# Patient Record
Sex: MALE | Race: WHITE | NOT HISPANIC OR LATINO | Employment: OTHER | URBAN - METROPOLITAN AREA
[De-identification: names, ages, dates, MRNs, and addresses within clinical notes are randomized per-mention and may not be internally consistent; named-entity substitution may affect disease eponyms.]

---

## 2023-01-29 PROBLEM — M25.641 STIFFNESS OF FINGER JOINT OF RIGHT HAND: Status: ACTIVE | Noted: 2023-01-29

## 2023-01-29 PROBLEM — M54.50 CHRONIC LOW BACK PAIN: Status: ACTIVE | Noted: 2023-01-29

## 2023-01-29 PROBLEM — L57.0 SENILE KERATOSIS: Status: ACTIVE | Noted: 2023-01-29

## 2023-01-29 PROBLEM — J20.9 ACUTE BRONCHITIS: Status: ACTIVE | Noted: 2023-01-29

## 2023-01-29 PROBLEM — I10 HYPERTENSION: Status: ACTIVE | Noted: 2023-01-29

## 2023-01-29 PROBLEM — M54.2 NECK PAIN ON RIGHT SIDE: Status: ACTIVE | Noted: 2023-01-29

## 2023-01-29 PROBLEM — M54.16 LUMBAR RADICULOPATHY, ACUTE: Status: ACTIVE | Noted: 2023-01-29

## 2023-01-29 PROBLEM — M79.673 CHRONIC FOOT PAIN: Status: ACTIVE | Noted: 2023-01-29

## 2023-01-29 PROBLEM — I73.9 PVD (PERIPHERAL VASCULAR DISEASE) (CMS-HCC): Status: ACTIVE | Noted: 2023-01-29

## 2023-01-29 PROBLEM — M72.0 DUPUYTREN'S CONTRACTURE OF RIGHT HAND: Status: ACTIVE | Noted: 2023-01-29

## 2023-01-29 PROBLEM — R19.7 DIARRHEA: Status: ACTIVE | Noted: 2023-01-29

## 2023-01-29 PROBLEM — R79.89 INCREASED VITAMIN B12 LEVEL: Status: ACTIVE | Noted: 2023-01-29

## 2023-01-29 PROBLEM — H61.20 CERUMEN IMPACTION: Status: ACTIVE | Noted: 2023-01-29

## 2023-01-29 PROBLEM — J01.90 ACUTE SINUSITIS: Status: ACTIVE | Noted: 2023-01-29

## 2023-01-29 PROBLEM — R05.9 COUGH: Status: ACTIVE | Noted: 2023-01-29

## 2023-01-29 PROBLEM — L30.9 DERMATITIS: Status: ACTIVE | Noted: 2023-01-29

## 2023-01-29 PROBLEM — L50.9 HIVES: Status: ACTIVE | Noted: 2023-01-29

## 2023-01-29 PROBLEM — N40.0 BENIGN ENLARGEMENT OF PROSTATE: Status: ACTIVE | Noted: 2023-01-29

## 2023-01-29 PROBLEM — E78.5 HYPERLIPIDEMIA: Status: ACTIVE | Noted: 2023-01-29

## 2023-01-29 PROBLEM — L85.3 DRY SKIN DERMATITIS: Status: ACTIVE | Noted: 2023-01-29

## 2023-01-29 PROBLEM — R74.8 INCREASED VITAMIN B12 LEVEL: Status: ACTIVE | Noted: 2023-01-29

## 2023-01-29 PROBLEM — R20.2 PARESTHESIA OF RIGHT LEG: Status: ACTIVE | Noted: 2023-01-29

## 2023-01-29 PROBLEM — R29.898 WEAKNESS OF RIGHT LOWER EXTREMITY: Status: ACTIVE | Noted: 2023-01-29

## 2023-01-29 PROBLEM — L82.1 SEBORRHEIC KERATOSIS: Status: ACTIVE | Noted: 2023-01-29

## 2023-01-29 PROBLEM — M25.562 KNEE PAIN, LEFT: Status: ACTIVE | Noted: 2023-01-29

## 2023-01-29 PROBLEM — M46.1 SACROILIITIS (CMS-HCC): Status: ACTIVE | Noted: 2023-01-29

## 2023-01-29 PROBLEM — E53.8 B12 DEFICIENCY: Status: ACTIVE | Noted: 2023-01-29

## 2023-01-29 PROBLEM — D22.4 MELANOCYTIC NEVUS OF SCALP: Status: ACTIVE | Noted: 2023-01-29

## 2023-01-29 PROBLEM — F41.1 GENERALIZED ANXIETY DISORDER: Status: ACTIVE | Noted: 2023-01-29

## 2023-01-29 PROBLEM — H10.33 ACUTE CONJUNCTIVITIS OF BOTH EYES: Status: ACTIVE | Noted: 2023-01-29

## 2023-01-29 PROBLEM — M15.9 GENERALIZED OSTEOARTHRITIS OF MULTIPLE SITES: Status: ACTIVE | Noted: 2023-01-29

## 2023-01-29 PROBLEM — E55.9 VITAMIN D DEFICIENCY: Status: ACTIVE | Noted: 2023-01-29

## 2023-01-29 PROBLEM — I73.00 RAYNAUDS DISEASE: Status: ACTIVE | Noted: 2023-01-29

## 2023-01-29 PROBLEM — M79.671 RIGHT FOOT PAIN: Status: ACTIVE | Noted: 2023-01-29

## 2023-01-29 PROBLEM — G89.29 CHRONIC FOOT PAIN: Status: ACTIVE | Noted: 2023-01-29

## 2023-01-29 PROBLEM — G89.29 CHRONIC LOW BACK PAIN: Status: ACTIVE | Noted: 2023-01-29

## 2023-01-29 RX ORDER — METHYLPREDNISOLONE 4 MG/1
4 TABLET ORAL
COMMUNITY
Start: 2021-10-02 | End: 2023-10-16 | Stop reason: WASHOUT

## 2023-01-29 RX ORDER — SODIUM FLUORIDE 5 MG/G
GEL, DENTIFRICE DENTAL
COMMUNITY
Start: 2020-11-18

## 2023-01-29 RX ORDER — BENZONATATE 200 MG/1
200 CAPSULE ORAL 3 TIMES DAILY PRN
COMMUNITY
Start: 2021-12-28 | End: 2023-10-16 | Stop reason: WASHOUT

## 2023-01-29 RX ORDER — ZINC SULFATE 50(220)MG
CAPSULE ORAL
COMMUNITY

## 2023-01-29 RX ORDER — AMLODIPINE BESYLATE 2.5 MG/1
1 TABLET ORAL DAILY
COMMUNITY
Start: 2016-05-17 | End: 2024-04-03

## 2023-01-29 RX ORDER — VIT C/E/ZN/COPPR/LUTEIN/ZEAXAN 250MG-90MG
25 CAPSULE ORAL
COMMUNITY

## 2023-03-13 ENCOUNTER — APPOINTMENT (OUTPATIENT)
Dept: PRIMARY CARE | Facility: CLINIC | Age: 73
End: 2023-03-13
Payer: MEDICARE

## 2023-03-21 ENCOUNTER — HOSPITAL ENCOUNTER (OUTPATIENT)
Dept: DATA CONVERSION | Facility: HOSPITAL | Age: 73
End: 2023-03-25
Attending: PHYSICAL MEDICINE & REHABILITATION
Payer: MEDICARE

## 2023-03-22 LAB
ANION GAP IN SER/PLAS: 11 MMOL/L (ref 10–20)
CALCIUM (MG/DL) IN SER/PLAS: 8.7 MG/DL (ref 8.6–10.3)
CARBON DIOXIDE, TOTAL (MMOL/L) IN SER/PLAS: 30 MMOL/L (ref 21–32)
CHLORIDE (MMOL/L) IN SER/PLAS: 97 MMOL/L (ref 98–107)
CREATININE (MG/DL) IN SER/PLAS: 0.83 MG/DL (ref 0.5–1.3)
ERYTHROCYTE DISTRIBUTION WIDTH (RATIO) BY AUTOMATED COUNT: 12.4 % (ref 11.5–14.5)
ERYTHROCYTE DISTRIBUTION WIDTH (RATIO) BY AUTOMATED COUNT: 12.4 % (ref 11.5–14.5)
ERYTHROCYTE MEAN CORPUSCULAR HEMOGLOBIN CONCENTRATION (G/DL) BY AUTOMATED: 34.4 G/DL (ref 32–36)
ERYTHROCYTE MEAN CORPUSCULAR HEMOGLOBIN CONCENTRATION (G/DL) BY AUTOMATED: 34.4 G/DL (ref 32–36)
ERYTHROCYTE MEAN CORPUSCULAR VOLUME (FL) BY AUTOMATED COUNT: 92 FL (ref 80–100)
ERYTHROCYTE MEAN CORPUSCULAR VOLUME (FL) BY AUTOMATED COUNT: 92 FL (ref 80–100)
ERYTHROCYTES (10*6/UL) IN BLOOD BY AUTOMATED COUNT: 3.32 X10E12/L (ref 4.5–5.9)
ERYTHROCYTES (10*6/UL) IN BLOOD BY AUTOMATED COUNT: 3.32 X10E12/L (ref 4.5–5.9)
GFR MALE: >90 ML/MIN/1.73M2
GLUCOSE (MG/DL) IN SER/PLAS: 90 MG/DL (ref 74–99)
HEMATOCRIT (%) IN BLOOD BY AUTOMATED COUNT: 30.5 % (ref 41–52)
HEMATOCRIT (%) IN BLOOD BY AUTOMATED COUNT: 30.5 % (ref 41–52)
HEMOGLOBIN (G/DL) IN BLOOD: 10.5 G/DL (ref 13.5–17.5)
HEMOGLOBIN (G/DL) IN BLOOD: 10.5 G/DL (ref 13.5–17.5)
LEUKOCYTES (10*3/UL) IN BLOOD BY AUTOMATED COUNT: 9 X10E9/L (ref 4.4–11.3)
LEUKOCYTES (10*3/UL) IN BLOOD BY AUTOMATED COUNT: 9 X10E9/L (ref 4.4–11.3)
PLATELETS (10*3/UL) IN BLOOD AUTOMATED COUNT: 393 X10E9/L (ref 150–450)
PLATELETS (10*3/UL) IN BLOOD AUTOMATED COUNT: 393 X10E9/L (ref 150–450)
POTASSIUM (MMOL/L) IN SER/PLAS: 3.7 MMOL/L (ref 3.5–5.3)
SODIUM (MMOL/L) IN SER/PLAS: 134 MMOL/L (ref 136–145)
UREA NITROGEN (MG/DL) IN SER/PLAS: 19 MG/DL (ref 6–23)

## 2023-03-30 ENCOUNTER — APPOINTMENT (OUTPATIENT)
Dept: PRIMARY CARE | Facility: CLINIC | Age: 73
End: 2023-03-30
Payer: MEDICARE

## 2023-04-03 ENCOUNTER — APPOINTMENT (OUTPATIENT)
Dept: PRIMARY CARE | Facility: CLINIC | Age: 73
End: 2023-04-03
Payer: MEDICARE

## 2023-04-05 ENCOUNTER — OFFICE VISIT (OUTPATIENT)
Dept: PRIMARY CARE | Facility: CLINIC | Age: 73
End: 2023-04-05
Payer: MEDICARE

## 2023-04-05 VITALS
HEART RATE: 100 BPM | WEIGHT: 134 LBS | SYSTOLIC BLOOD PRESSURE: 109 MMHG | HEIGHT: 68 IN | TEMPERATURE: 96.6 F | OXYGEN SATURATION: 95 % | DIASTOLIC BLOOD PRESSURE: 74 MMHG | RESPIRATION RATE: 16 BRPM | BODY MASS INDEX: 20.31 KG/M2

## 2023-04-05 DIAGNOSIS — Z00.00 HEALTHCARE MAINTENANCE: ICD-10-CM

## 2023-04-05 DIAGNOSIS — M54.40 CHRONIC LOW BACK PAIN WITH SCIATICA, SCIATICA LATERALITY UNSPECIFIED, UNSPECIFIED BACK PAIN LATERALITY: ICD-10-CM

## 2023-04-05 DIAGNOSIS — G89.29 CHRONIC LOW BACK PAIN WITH SCIATICA, SCIATICA LATERALITY UNSPECIFIED, UNSPECIFIED BACK PAIN LATERALITY: ICD-10-CM

## 2023-04-05 DIAGNOSIS — I73.9 PVD (PERIPHERAL VASCULAR DISEASE) (CMS-HCC): ICD-10-CM

## 2023-04-05 DIAGNOSIS — D64.9 NORMOCYTIC ANEMIA: ICD-10-CM

## 2023-04-05 DIAGNOSIS — Z00.00 ROUTINE HEALTH MAINTENANCE: ICD-10-CM

## 2023-04-05 DIAGNOSIS — J18.9 COMMUNITY ACQUIRED PNEUMONIA OF LEFT UPPER LOBE OF LUNG: Primary | ICD-10-CM

## 2023-04-05 DIAGNOSIS — E78.5 HYPERLIPIDEMIA, UNSPECIFIED HYPERLIPIDEMIA TYPE: ICD-10-CM

## 2023-04-05 DIAGNOSIS — R74.8 ELEVATED LIVER ENZYMES: ICD-10-CM

## 2023-04-05 DIAGNOSIS — F10.90 ALCOHOL USE DISORDER: ICD-10-CM

## 2023-04-05 DIAGNOSIS — I10 PRIMARY HYPERTENSION: ICD-10-CM

## 2023-04-05 PROCEDURE — 1160F RVW MEDS BY RX/DR IN RCRD: CPT | Performed by: STUDENT IN AN ORGANIZED HEALTH CARE EDUCATION/TRAINING PROGRAM

## 2023-04-05 PROCEDURE — 1036F TOBACCO NON-USER: CPT | Performed by: STUDENT IN AN ORGANIZED HEALTH CARE EDUCATION/TRAINING PROGRAM

## 2023-04-05 PROCEDURE — 3078F DIAST BP <80 MM HG: CPT | Performed by: STUDENT IN AN ORGANIZED HEALTH CARE EDUCATION/TRAINING PROGRAM

## 2023-04-05 PROCEDURE — 1159F MED LIST DOCD IN RCRD: CPT | Performed by: STUDENT IN AN ORGANIZED HEALTH CARE EDUCATION/TRAINING PROGRAM

## 2023-04-05 PROCEDURE — 3074F SYST BP LT 130 MM HG: CPT | Performed by: STUDENT IN AN ORGANIZED HEALTH CARE EDUCATION/TRAINING PROGRAM

## 2023-04-05 PROCEDURE — 99214 OFFICE O/P EST MOD 30 MIN: CPT | Performed by: STUDENT IN AN ORGANIZED HEALTH CARE EDUCATION/TRAINING PROGRAM

## 2023-04-05 PROCEDURE — 3008F BODY MASS INDEX DOCD: CPT | Performed by: STUDENT IN AN ORGANIZED HEALTH CARE EDUCATION/TRAINING PROGRAM

## 2023-04-05 RX ORDER — CEFDINIR 300 MG/1
300 CAPSULE ORAL 2 TIMES DAILY
Qty: 20 CAPSULE | Refills: 0 | Status: SHIPPED | OUTPATIENT
Start: 2023-04-05 | End: 2023-04-15

## 2023-04-05 RX ORDER — AZITHROMYCIN 1 G/1
1 POWDER, FOR SUSPENSION ORAL ONCE
COMMUNITY
End: 2023-10-16 | Stop reason: WASHOUT

## 2023-04-05 RX ORDER — TRAMADOL HYDROCHLORIDE 50 MG/1
50 TABLET ORAL EVERY 8 HOURS PRN
COMMUNITY
End: 2023-10-16 | Stop reason: WASHOUT

## 2023-04-05 RX ORDER — LOSARTAN POTASSIUM 50 MG/1
50 TABLET ORAL DAILY
COMMUNITY
End: 2024-04-03

## 2023-04-05 ASSESSMENT — ENCOUNTER SYMPTOMS
WHEEZING: 0
SHORTNESS OF BREATH: 0
HEADACHES: 1
LIGHT-HEADEDNESS: 0
NAUSEA: 0
SINUS PAIN: 0
DIZZINESS: 0
VOMITING: 0
FEVER: 0
COUGH: 1
CHILLS: 0
DIAPHORESIS: 0

## 2023-04-05 ASSESSMENT — PAIN SCALES - GENERAL: PAINLEVEL: 0-NO PAIN

## 2023-04-05 ASSESSMENT — PATIENT HEALTH QUESTIONNAIRE - PHQ9
1. LITTLE INTEREST OR PLEASURE IN DOING THINGS: NOT AT ALL
2. FEELING DOWN, DEPRESSED OR HOPELESS: NOT AT ALL
SUM OF ALL RESPONSES TO PHQ9 QUESTIONS 1 AND 2: 0

## 2023-04-05 NOTE — PROGRESS NOTES
"Subjective   Patient ID: Hal Gregorio is a 72 y.o. male who presents for Establish Care.  He is here to establish care.     He was seen in urgent care on Monday for URI sy mptoms. He was started on zpack and was started on cough suppressant.    He reports chronic pain in his low back and sciatica.    He was recently hospitalized. Hospital course reviewed. He stopped drinking since his hospitalization.  He is not involved in alcoholic Anonymous but does not find it would be of much help for him.    Current Specialists:  Pain Management - no longer following  Cardiology - Dr. Lainez  Neurosurgery - Dr. Aldair Cruz  Ophthalmology - Sees retina specialist    Social History:  Served in the [employer]. He is  and has 2 daughters and 1 son. He has 4 grand kids.         Review of Systems   Constitutional:  Negative for chills, diaphoresis and fever.   HENT:  Negative for ear pain and sinus pain.    Respiratory:  Positive for cough (Productive of yellowish sputum). Negative for shortness of breath and wheezing.    Cardiovascular:  Negative for chest pain.   Gastrointestinal:  Negative for nausea and vomiting.   Neurological:  Positive for headaches. Negative for dizziness and light-headedness.       /74 (BP Location: Left arm, Patient Position: Sitting, BP Cuff Size: Adult)   Pulse 100   Temp 35.9 °C (96.6 °F) (Temporal)   Resp 16   Ht 1.727 m (5' 8\")   Wt 60.8 kg (134 lb)   SpO2 95%   BMI 20.37 kg/m²     Objective   Physical Exam  Vitals reviewed.   Constitutional:       General: He is not in acute distress.     Appearance: Normal appearance.   HENT:      Head: Normocephalic.      Right Ear: Tympanic membrane, ear canal and external ear normal. There is no impacted cerumen.      Left Ear: Tympanic membrane, ear canal and external ear normal. There is no impacted cerumen.      Mouth/Throat:      Mouth: Mucous membranes are moist.      Pharynx: Oropharynx is clear. No oropharyngeal exudate or posterior " oropharyngeal erythema.   Cardiovascular:      Rate and Rhythm: Normal rate and regular rhythm.   Pulmonary:      Effort: Pulmonary effort is normal. No respiratory distress.      Breath sounds: Rales (SURYA rales present) present. No wheezing or rhonchi.   Abdominal:      General: There is no distension.   Musculoskeletal:         General: No deformity.      Cervical back: Neck supple. No tenderness.   Lymphadenopathy:      Cervical: No cervical adenopathy.   Skin:     Coloration: Skin is not jaundiced.   Neurological:      Mental Status: He is alert.   Psychiatric:         Mood and Affect: Mood normal.         Behavior: Behavior normal.         Assessment/Plan   Problem List Items Addressed This Visit          Respiratory    Community acquired pneumonia of left upper lobe of lung - Primary     Symptoms and physical exam consistent with possible developing pneumonia of left upper lobe.  Will empirically treat with Omnicef twice a day x10 days.  Has tolerated Keflex in the past.  Follow-up if no improvement may need to get x-ray imaging of the chest.         Relevant Medications    cefdinir (Omnicef) 300 mg capsule       Circulatory    PVD (peripheral vascular disease) (CMS/HCC)     Continue follow-up with cardiology.         Hypertension     Continue losartan 50 mg a day and amlodipine 2.5 mg a day.  Blood pressure controlled today            Hematologic    Normocytic anemia     Repeat CBC in 2-4 weeks.         Relevant Orders    CBC       Other    Chronic low back pain     Advised that I do not treat chronic low back pain and if you would like this to be assessed in the future he should follow-up with pain management and that I could refer if needed.  He said that he is also interested in medical marijuana and I advised that I would not be able to support this plan.         Hyperlipidemia     Followed with cardiology for this, note reviewed.         [facility] course reviewed.  Follow-up  with me in 3 months for complete physical exam or sooner if needed.         Relevant Orders    Follow Up In [facility] - PCP    Alcohol use disorder     Hospitalization course reviewed.  Patient states he has not drank at least since his hospitalization and does not remember the last time he had a drink.  He does not follow with alcoholic Anonymous and recommended to follow-up with a support group for this.  Congratulated his efforts on quitting.         Elevated liver enzymes     Repeat CMP in 2-4 weeks. Noted on prior CMP during hospitalization.         Relevant Orders    Comprehensive Metabolic Panel     Other Visit Diagnoses       Routine health maintenance

## 2023-04-06 ENCOUNTER — TELEPHONE (OUTPATIENT)
Dept: PRIMARY CARE | Facility: CLINIC | Age: 73
End: 2023-04-06
Payer: MEDICARE

## 2023-04-06 NOTE — TELEPHONE ENCOUNTER
Pt called stating whole family was diagnosed with parainfluenza, and cough can last as long as 3wks.  He stated grandkids and kids all have it, they were recently all together.

## 2023-04-06 NOTE — ASSESSMENT & PLAN NOTE
Hospital course reviewed.  Follow-up with me in 3 months for complete physical exam or sooner if needed.

## 2023-04-06 NOTE — ASSESSMENT & PLAN NOTE
Advised that I do not treat chronic low back pain and if you would like this to be assessed in the future he should follow-up with pain management and that I could refer if needed.  He said that he is also interested in medical marijuana and I advised that I would not be able to support this plan.

## 2023-04-06 NOTE — TELEPHONE ENCOUNTER
----- Message from Derrick He DO sent at 4/5/2023 10:16 PM EDT -----  Please let him know I'd like to repeat blood work in 2-4 weeks to monitor anemia and liver enzymes.

## 2023-04-06 NOTE — ASSESSMENT & PLAN NOTE
Symptoms and physical exam consistent with possible developing pneumonia of left upper lobe.  Will empirically treat with Omnicef twice a day x10 days.  Has tolerated Keflex in the past.  Follow-up if no improvement may need to get x-ray imaging of the chest.

## 2023-04-06 NOTE — ASSESSMENT & PLAN NOTE
Hospitalization course reviewed.  Patient states he has not drank at least since his hospitalization and does not remember the last time he had a drink.  He does not follow with alcoholic Anonymous and recommended to follow-up with a support group for this.  Congratulated his efforts on quitting.

## 2023-07-07 PROBLEM — S63.501A SPRAIN OF RIGHT WRIST: Status: ACTIVE | Noted: 2023-07-07

## 2023-07-07 PROBLEM — M04.1: Status: ACTIVE | Noted: 2023-07-07

## 2023-07-07 PROBLEM — M25.531 ACUTE PAIN OF RIGHT WRIST: Status: ACTIVE | Noted: 2023-07-07

## 2023-07-07 PROBLEM — F10.939 ALCOHOL WITHDRAWAL (MULTI): Status: ACTIVE | Noted: 2023-07-07

## 2023-07-10 ENCOUNTER — LAB (OUTPATIENT)
Dept: LAB | Facility: LAB | Age: 73
End: 2023-07-10
Payer: MEDICARE

## 2023-07-10 ENCOUNTER — OFFICE VISIT (OUTPATIENT)
Dept: PRIMARY CARE | Facility: CLINIC | Age: 73
End: 2023-07-10
Payer: MEDICARE

## 2023-07-10 VITALS
SYSTOLIC BLOOD PRESSURE: 112 MMHG | OXYGEN SATURATION: 97 % | HEART RATE: 83 BPM | TEMPERATURE: 98 F | WEIGHT: 135 LBS | HEIGHT: 66 IN | BODY MASS INDEX: 21.69 KG/M2 | DIASTOLIC BLOOD PRESSURE: 72 MMHG

## 2023-07-10 DIAGNOSIS — Z13.89 SCREENING FOR MULTIPLE CONDITIONS: ICD-10-CM

## 2023-07-10 DIAGNOSIS — G89.29 CHRONIC NECK PAIN: ICD-10-CM

## 2023-07-10 DIAGNOSIS — I73.9 PVD (PERIPHERAL VASCULAR DISEASE) (CMS-HCC): ICD-10-CM

## 2023-07-10 DIAGNOSIS — F10.90 ALCOHOL USE DISORDER: ICD-10-CM

## 2023-07-10 DIAGNOSIS — E78.5 HYPERLIPIDEMIA, UNSPECIFIED HYPERLIPIDEMIA TYPE: ICD-10-CM

## 2023-07-10 DIAGNOSIS — I10 PRIMARY HYPERTENSION: ICD-10-CM

## 2023-07-10 DIAGNOSIS — M54.2 CHRONIC NECK PAIN: ICD-10-CM

## 2023-07-10 DIAGNOSIS — R74.8 ELEVATED LIVER ENZYMES: ICD-10-CM

## 2023-07-10 DIAGNOSIS — K40.90 LEFT INGUINAL HERNIA: ICD-10-CM

## 2023-07-10 DIAGNOSIS — G89.29 CHRONIC LOW BACK PAIN WITH SCIATICA, SCIATICA LATERALITY UNSPECIFIED, UNSPECIFIED BACK PAIN LATERALITY: ICD-10-CM

## 2023-07-10 DIAGNOSIS — M54.40 CHRONIC LOW BACK PAIN WITH SCIATICA, SCIATICA LATERALITY UNSPECIFIED, UNSPECIFIED BACK PAIN LATERALITY: ICD-10-CM

## 2023-07-10 DIAGNOSIS — Z00.00 HEALTHCARE MAINTENANCE: ICD-10-CM

## 2023-07-10 DIAGNOSIS — Z12.5 ENCOUNTER FOR PROSTATE CANCER SCREENING: ICD-10-CM

## 2023-07-10 DIAGNOSIS — J18.1: Primary | ICD-10-CM

## 2023-07-10 DIAGNOSIS — R13.19 OTHER DYSPHAGIA: ICD-10-CM

## 2023-07-10 DIAGNOSIS — E53.8 B12 DEFICIENCY: ICD-10-CM

## 2023-07-10 DIAGNOSIS — D64.9 NORMOCYTIC ANEMIA: ICD-10-CM

## 2023-07-10 LAB
ALANINE AMINOTRANSFERASE (SGPT) (U/L) IN SER/PLAS: 11 U/L (ref 10–52)
ALBUMIN (G/DL) IN SER/PLAS: 4.3 G/DL (ref 3.4–5)
ALKALINE PHOSPHATASE (U/L) IN SER/PLAS: 69 U/L (ref 33–136)
ANION GAP IN SER/PLAS: 11 MMOL/L (ref 10–20)
ASPARTATE AMINOTRANSFERASE (SGOT) (U/L) IN SER/PLAS: 17 U/L (ref 9–39)
BILIRUBIN TOTAL (MG/DL) IN SER/PLAS: 0.4 MG/DL (ref 0–1.2)
CALCIUM (MG/DL) IN SER/PLAS: 9.7 MG/DL (ref 8.6–10.3)
CARBON DIOXIDE, TOTAL (MMOL/L) IN SER/PLAS: 31 MMOL/L (ref 21–32)
CHLORIDE (MMOL/L) IN SER/PLAS: 101 MMOL/L (ref 98–107)
COBALAMIN (VITAMIN B12) (PG/ML) IN SER/PLAS: 194 PG/ML (ref 211–911)
CREATININE (MG/DL) IN SER/PLAS: 0.95 MG/DL (ref 0.5–1.3)
ERYTHROCYTE DISTRIBUTION WIDTH (RATIO) BY AUTOMATED COUNT: 12.8 % (ref 11.5–14.5)
ERYTHROCYTE MEAN CORPUSCULAR HEMOGLOBIN CONCENTRATION (G/DL) BY AUTOMATED: 32.7 G/DL (ref 32–36)
ERYTHROCYTE MEAN CORPUSCULAR VOLUME (FL) BY AUTOMATED COUNT: 88 FL (ref 80–100)
ERYTHROCYTES (10*6/UL) IN BLOOD BY AUTOMATED COUNT: 4.51 X10E12/L (ref 4.5–5.9)
GFR MALE: 84 ML/MIN/1.73M2
GLUCOSE (MG/DL) IN SER/PLAS: 89 MG/DL (ref 74–99)
HEMATOCRIT (%) IN BLOOD BY AUTOMATED COUNT: 39.8 % (ref 41–52)
HEMOGLOBIN (G/DL) IN BLOOD: 13 G/DL (ref 13.5–17.5)
LEUKOCYTES (10*3/UL) IN BLOOD BY AUTOMATED COUNT: 8.4 X10E9/L (ref 4.4–11.3)
PLATELETS (10*3/UL) IN BLOOD AUTOMATED COUNT: 218 X10E9/L (ref 150–450)
POTASSIUM (MMOL/L) IN SER/PLAS: 4.1 MMOL/L (ref 3.5–5.3)
PROSTATE SPECIFIC ANTIGEN,SCREEN: 3.75 NG/ML (ref 0–4)
PROTEIN TOTAL: 7.6 G/DL (ref 6.4–8.2)
SODIUM (MMOL/L) IN SER/PLAS: 139 MMOL/L (ref 136–145)
THYROTROPIN (MIU/L) IN SER/PLAS BY DETECTION LIMIT <= 0.05 MIU/L: 1.11 MIU/L (ref 0.44–3.98)
UREA NITROGEN (MG/DL) IN SER/PLAS: 11 MG/DL (ref 6–23)

## 2023-07-10 PROCEDURE — G0439 PPPS, SUBSEQ VISIT: HCPCS | Performed by: STUDENT IN AN ORGANIZED HEALTH CARE EDUCATION/TRAINING PROGRAM

## 2023-07-10 PROCEDURE — 1159F MED LIST DOCD IN RCRD: CPT | Performed by: STUDENT IN AN ORGANIZED HEALTH CARE EDUCATION/TRAINING PROGRAM

## 2023-07-10 PROCEDURE — 3074F SYST BP LT 130 MM HG: CPT | Performed by: STUDENT IN AN ORGANIZED HEALTH CARE EDUCATION/TRAINING PROGRAM

## 2023-07-10 PROCEDURE — 3008F BODY MASS INDEX DOCD: CPT | Performed by: STUDENT IN AN ORGANIZED HEALTH CARE EDUCATION/TRAINING PROGRAM

## 2023-07-10 PROCEDURE — 84443 ASSAY THYROID STIM HORMONE: CPT

## 2023-07-10 PROCEDURE — 36415 COLL VENOUS BLD VENIPUNCTURE: CPT

## 2023-07-10 PROCEDURE — 3078F DIAST BP <80 MM HG: CPT | Performed by: STUDENT IN AN ORGANIZED HEALTH CARE EDUCATION/TRAINING PROGRAM

## 2023-07-10 PROCEDURE — 1126F AMNT PAIN NOTED NONE PRSNT: CPT | Performed by: STUDENT IN AN ORGANIZED HEALTH CARE EDUCATION/TRAINING PROGRAM

## 2023-07-10 PROCEDURE — 1036F TOBACCO NON-USER: CPT | Performed by: STUDENT IN AN ORGANIZED HEALTH CARE EDUCATION/TRAINING PROGRAM

## 2023-07-10 PROCEDURE — G0444 DEPRESSION SCREEN ANNUAL: HCPCS | Performed by: STUDENT IN AN ORGANIZED HEALTH CARE EDUCATION/TRAINING PROGRAM

## 2023-07-10 PROCEDURE — 82607 VITAMIN B-12: CPT

## 2023-07-10 PROCEDURE — G0103 PSA SCREENING: HCPCS

## 2023-07-10 PROCEDURE — 80053 COMPREHEN METABOLIC PANEL: CPT

## 2023-07-10 PROCEDURE — 99214 OFFICE O/P EST MOD 30 MIN: CPT | Performed by: STUDENT IN AN ORGANIZED HEALTH CARE EDUCATION/TRAINING PROGRAM

## 2023-07-10 PROCEDURE — 85027 COMPLETE CBC AUTOMATED: CPT

## 2023-07-10 PROCEDURE — 1160F RVW MEDS BY RX/DR IN RCRD: CPT | Performed by: STUDENT IN AN ORGANIZED HEALTH CARE EDUCATION/TRAINING PROGRAM

## 2023-07-10 PROCEDURE — G0442 ANNUAL ALCOHOL SCREEN 15 MIN: HCPCS | Performed by: STUDENT IN AN ORGANIZED HEALTH CARE EDUCATION/TRAINING PROGRAM

## 2023-07-10 RX ORDER — DIPHENHYDRAMINE HCL 25 MG
CAPSULE ORAL EVERY 6 HOURS PRN
COMMUNITY

## 2023-07-10 RX ORDER — MULTIVITAMIN
1 TABLET ORAL DAILY
COMMUNITY

## 2023-07-10 RX ORDER — ASCORBIC ACID 1000 MG
175 TABLET ORAL DAILY
COMMUNITY
End: 2023-10-16 | Stop reason: WASHOUT

## 2023-07-10 ASSESSMENT — ENCOUNTER SYMPTOMS
LIGHT-HEADEDNESS: 0
DIZZINESS: 0
DIARRHEA: 0
OCCASIONAL FEELINGS OF UNSTEADINESS: 0
SHORTNESS OF BREATH: 0
DYSURIA: 0
DEPRESSION: 0
FEVER: 0
NAUSEA: 0
VOMITING: 0
CHILLS: 0
DIAPHORESIS: 0
LOSS OF SENSATION IN FEET: 1

## 2023-07-10 ASSESSMENT — PATIENT HEALTH QUESTIONNAIRE - PHQ9
2. FEELING DOWN, DEPRESSED OR HOPELESS: NOT AT ALL
2. FEELING DOWN, DEPRESSED OR HOPELESS: NOT AT ALL
1. LITTLE INTEREST OR PLEASURE IN DOING THINGS: NOT AT ALL
1. LITTLE INTEREST OR PLEASURE IN DOING THINGS: NOT AT ALL
SUM OF ALL RESPONSES TO PHQ9 QUESTIONS 1 AND 2: 0
SUM OF ALL RESPONSES TO PHQ9 QUESTIONS 1 AND 2: 0

## 2023-07-10 NOTE — PROGRESS NOTES
Subjective   Reason for Visit: Hal Gregorio is an 72 y.o. male here for a Medicare Wellness visit.     Past Medical, Surgical, and Family History reviewed and updated in chart.         He is here for [facility].     He reports history of chronic neck pain.    Questions today:  He is interested in his TSH.   Some chills when he gets outside and takes a little while before he gets to normal.   Rotator cuff issues are chronic.   Has left inguinal hernia present. Present for the last year.   No longer sees pain management.    Prior MRI showed degenerative disc disease and diffuse bulging of the annulus fibrosis at the C5 and 6 level on MRIs from 1995 in 1997.  MRI of the lumbar spine in 1995 showed degenerative changes in the lumbar spine and an old chest x-ray from 1988 is also presented today, which showed rib fractures.  All of these old records the patient presented today will be scanned into the electronic medical record    Asking for tramadol-advised I cannot treat again.    Reports trouble swallowing.     Current specialists:  Pain Management - reports no longer following  Cardiology - Dr. Lainez  Neurosurgery - Dr. Cruz  Ophthalmology - Retinal specialist for bleeding in eye and vein occlusion        Patient Care Team:  Derrick He DO as PCP - General (Internal Medicine)  Lakhwinder Lainez MD as PCP - [facility] Attributed Provider     Review of Systems   Constitutional:  Negative for chills, diaphoresis and fever.   HENT:  Negative for hearing loss.    Eyes:  Negative for visual disturbance.   Respiratory:  Negative for shortness of breath.    Cardiovascular:  Positive for chest pain (chest pain after lifting too much weight/over exertion that lingers for a few days after).   Gastrointestinal:  Negative for diarrhea, nausea and vomiting.   Endocrine: Positive for heat intolerance. Negative for cold intolerance.   Genitourinary:  Negative for dysuria.   Skin:  Negative for rash.   Neurological:  Negative for  "dizziness and light-headedness.       Objective   Vitals:  /72   Pulse 83   Temp 36.7 °C (98 °F) (Skin)   Ht 1.676 m (5' 6\")   Wt 61.2 kg (135 lb)   SpO2 97%   BMI 21.79 kg/m²       Physical Exam  Vitals reviewed.   Constitutional:       General: He is not in acute distress.     Appearance: Normal appearance.   HENT:      Head: Normocephalic.      Right Ear: Tympanic membrane, ear canal and external ear normal. There is no impacted cerumen.      Left Ear: Tympanic membrane, ear canal and external ear normal. There is no impacted cerumen.      Mouth/Throat:      Mouth: Mucous membranes are moist.      Pharynx: Oropharynx is clear. No oropharyngeal exudate or posterior oropharyngeal erythema.   Cardiovascular:      Rate and Rhythm: Normal rate and regular rhythm.   Pulmonary:      Effort: Pulmonary effort is normal. No respiratory distress.      Breath sounds: No wheezing, rhonchi or rales.   Abdominal:      General: Bowel sounds are normal. There is no distension.      Palpations: Abdomen is soft. There is no mass.      Tenderness: There is no abdominal tenderness. There is no guarding or rebound.      Hernia: A hernia (left inguinal) is present.   Musculoskeletal:         General: No deformity.      Cervical back: Neck supple. No tenderness.   Lymphadenopathy:      Cervical: No cervical adenopathy.   Skin:     Coloration: Skin is not jaundiced.   Neurological:      Mental Status: He is alert.   Psychiatric:         Mood and Affect: Mood normal.         Behavior: Behavior normal.         Assessment/Plan   Problem List Items Addressed This Visit       B12 deficiency    Current Assessment & Plan     Reordered b12 today         Relevant Orders    Vitamin B12 (Completed)    Chronic low back pain    Current Assessment & Plan     Advised again that I do not treat chronic low back pain and if he would like this to be assessed in the future he should follow-up with pain management and that I could refer if " needed. May also need to see neurosurgery.         Hyperlipidemia    Current Assessment & Plan     Followed with cardiology for this         PVD (peripheral vascular disease) (CMS/[facility])    Current Assessment & Plan     Continue follow-up with cardiology.         Hypertension    Current Assessment & Plan     Continue losartan 50 mg a day and amlodipine 2.5 mg a day.  Blood pressure controlled today         Relevant Orders    TSH with reflex to Free T4 if abnormal (Completed)    Healthcare maintenance    Current Assessment & Plan     CPE completed.  Advised to keep a heart healthy, low fat  diet with fruits and veggies like Mediterranean diet.  Advised on the importance of exercise and maintaining 150 minutes of exercise per week (30 minutes per day 5 days a week).  Advised on regular eye and dental visits.  Discussed age appropriate cancer screening, immunizations and recommendations given.  Discussed avoiding illicit drugs and tobacco. Advised on appropriate use of alcohol.  Advised to wear seat belt.    Health Maintenance  -Prostate Cancer Screening: Due, ordered  -Vaccinations: Recommend pneumonia, Shingrix, UTD TDAP, rec bivalent covid booster.  -Lung Cancer Screening: Not indicated  -AAA Screening: Current with Dr. Lainez  -Colonoscopy: Colonoscopy was done September 2021, due in 2026    F/u 3 months  MWV 1 year or sooner PRN         Relevant Orders    Lipid panel    Follow Up In [facility] - PCP - Medicare Annual    Follow Up In [facility] - PCP - Established    Alcohol use disorder    Current Assessment & Plan     Hospitalization course reviewed.  Patient stated previously he has not drank at least since his hospitalization and does not remember the last time he had a drink.  He does not follow with alcoholic Anonymous and recommended to follow-up with a support group for this.  Congratulated his efforts on quitting.         Normocytic anemia    Current Assessment & Plan     Improved to  13, will check b12         Elevated liver enzymes    Current Assessment & Plan     Repeat CMP         Left inguinal hernia    Current Assessment & Plan     Referred to general surgery for this. ER if stuck out or painful         Relevant Orders    Referral to General Surgery    Encounter for prostate cancer screening    Relevant Orders    Prostate Spec.Ag,Screen (Completed)    Consolidation of right lower lobe of lung (CMS/HCC) - Primary    Current Assessment & Plan     Repeat xray chest to monitor resolution of this         Relevant Orders    XR chest 2 views    Other dysphagia    Current Assessment & Plan     F/u 2-4 weeks for this-unable to address today.         Chronic neck pain    Current Assessment & Plan     Needs to follow up with neurosurgery for this.          Other Visit Diagnoses       Screening for multiple conditions [Z13.89]

## 2023-07-11 ENCOUNTER — TELEPHONE (OUTPATIENT)
Dept: PRIMARY CARE | Facility: CLINIC | Age: 73
End: 2023-07-11
Payer: MEDICARE

## 2023-07-11 DIAGNOSIS — D64.9 NORMOCYTIC ANEMIA: ICD-10-CM

## 2023-07-11 DIAGNOSIS — E53.8 B12 DEFICIENCY: Primary | ICD-10-CM

## 2023-07-11 NOTE — TELEPHONE ENCOUNTER
----- Message from Derrick He DO sent at 7/10/2023  5:43 PM EDT -----  Please let him know I ordered an x-ray to help make sure that the pneumonia in the lung has resolved from April.

## 2023-07-12 ENCOUNTER — TELEPHONE (OUTPATIENT)
Dept: PRIMARY CARE | Facility: CLINIC | Age: 73
End: 2023-07-12
Payer: MEDICARE

## 2023-07-12 NOTE — TELEPHONE ENCOUNTER
----- Message from Derrick He DO sent at 7/11/2023  4:10 PM EDT -----  Please let him know:    1. Blood counts are improving, hgb is 13.0.  Suspect it still low because of B12 being low.  2. His B12 is low.  If he does not have any numbness or tingling we can resume taking vitamin B12 1000 mg.  If he does have numbness or tingling he will need to get B12 injections.  He should take B12 for 3 months and we can recheck B12 levels then in addition with a repeat CBC.  3.  The rest of the labs are normal including prostate, thyroid, kidney, liver, electrolytes.

## 2023-07-15 PROBLEM — Z12.5 ENCOUNTER FOR PROSTATE CANCER SCREENING: Status: ACTIVE | Noted: 2023-07-15

## 2023-07-15 PROBLEM — R13.19 OTHER DYSPHAGIA: Status: ACTIVE | Noted: 2023-07-15

## 2023-07-15 PROBLEM — G89.29 CHRONIC NECK PAIN: Status: ACTIVE | Noted: 2023-07-15

## 2023-07-15 PROBLEM — K40.90 LEFT INGUINAL HERNIA: Status: ACTIVE | Noted: 2023-07-15

## 2023-07-15 PROBLEM — M54.2 CHRONIC NECK PAIN: Status: ACTIVE | Noted: 2023-07-15

## 2023-07-15 PROBLEM — J18.1: Status: ACTIVE | Noted: 2023-07-15

## 2023-07-15 NOTE — ASSESSMENT & PLAN NOTE
Hospitalization course reviewed.  Patient stated previously he has not drank at least since his hospitalization and does not remember the last time he had a drink.  He does not follow with alcoholic Anonymous and recommended to follow-up with a support group for this.  Congratulated his efforts on quitting.

## 2023-07-15 NOTE — ASSESSMENT & PLAN NOTE
Advised again that I do not treat chronic low back pain and if he would like this to be assessed in the future he should follow-up with pain management and that I could refer if needed. May also need to see neurosurgery.

## 2023-07-15 NOTE — ASSESSMENT & PLAN NOTE
CPE completed.  Advised to keep a heart healthy, low fat  diet with fruits and veggies like Mediterranean diet.  Advised on the importance of exercise and maintaining 150 minutes of exercise per week (30 minutes per day 5 days a week).  Advised on regular eye and dental visits.  Discussed age appropriate cancer screening, immunizations and recommendations given.  Discussed avoiding illicit drugs and tobacco. Advised on appropriate use of alcohol.  Advised to wear seat belt.    Health Maintenance  -Prostate Cancer Screening: Due, ordered  -Vaccinations: Recommend pneumonia, Shingrix, UTD TDAP, rec bivalent covid booster.  -Lung Cancer Screening: Not indicated  -AAA Screening: Current with Dr. Lainez  -Colonoscopy: Colonoscopy was done September 2021, due in 2026    F/u 3 months  MWV 1 year or sooner PRN

## 2023-07-17 ENCOUNTER — TELEPHONE (OUTPATIENT)
Dept: PRIMARY CARE | Facility: CLINIC | Age: 73
End: 2023-07-17
Payer: MEDICARE

## 2023-07-17 NOTE — TELEPHONE ENCOUNTER
----- Message from Derrick He DO sent at 7/15/2023  1:16 PM EDT -----  Please let him know that I would like to see him in 2-4 weeks for his trouble swallowing and recommend seeing neurosurgery for his neck pain- he saw Dr. Cruz a few years ago.

## 2023-07-24 ENCOUNTER — TELEPHONE (OUTPATIENT)
Dept: PRIMARY CARE | Facility: CLINIC | Age: 73
End: 2023-07-24
Payer: MEDICARE

## 2023-07-24 DIAGNOSIS — J18.1: Primary | ICD-10-CM

## 2023-07-24 NOTE — TELEPHONE ENCOUNTER
----- Message from Derrick He DO sent at 7/24/2023  7:59 AM EDT -----  Please let him know his repeat chest x-ray shows that the opacity on the right lower part of the lung is present and the radiologist recommends a CAT scan to look at it in more detail which I have ordered.

## 2023-08-02 ENCOUNTER — TELEPHONE (OUTPATIENT)
Dept: PRIMARY CARE | Facility: CLINIC | Age: 73
End: 2023-08-02
Payer: MEDICARE

## 2023-08-14 LAB
ANION GAP IN SER/PLAS: 10 MMOL/L (ref 10–20)
CALCIUM (MG/DL) IN SER/PLAS: 9.4 MG/DL (ref 8.6–10.3)
CARBON DIOXIDE, TOTAL (MMOL/L) IN SER/PLAS: 32 MMOL/L (ref 21–32)
CHLORIDE (MMOL/L) IN SER/PLAS: 101 MMOL/L (ref 98–107)
CREATININE (MG/DL) IN SER/PLAS: 0.99 MG/DL (ref 0.5–1.3)
GFR MALE: 80 ML/MIN/1.73M2
GLUCOSE (MG/DL) IN SER/PLAS: 92 MG/DL (ref 74–99)
POTASSIUM (MMOL/L) IN SER/PLAS: 3.8 MMOL/L (ref 3.5–5.3)
SODIUM (MMOL/L) IN SER/PLAS: 139 MMOL/L (ref 136–145)
UREA NITROGEN (MG/DL) IN SER/PLAS: 14 MG/DL (ref 6–23)

## 2023-08-23 ENCOUNTER — HOSPITAL ENCOUNTER (OUTPATIENT)
Dept: DATA CONVERSION | Facility: HOSPITAL | Age: 73
End: 2023-08-23
Attending: SURGERY | Admitting: SURGERY
Payer: MEDICARE

## 2023-08-23 DIAGNOSIS — K40.20 BILATERAL INGUINAL HERNIA, WITHOUT OBSTRUCTION OR GANGRENE, NOT SPECIFIED AS RECURRENT: ICD-10-CM

## 2023-08-23 DIAGNOSIS — K40.90 UNILATERAL INGUINAL HERNIA, WITHOUT OBSTRUCTION OR GANGRENE, NOT SPECIFIED AS RECURRENT: ICD-10-CM

## 2023-09-05 LAB
COMPLETE PATHOLOGY REPORT: NORMAL
CONVERTED CLINICAL DIAGNOSIS-HISTORY: NORMAL
CONVERTED FINAL DIAGNOSIS: NORMAL
CONVERTED FINAL REPORT PDF LINK TO COPY AND PASTE: NORMAL
CONVERTED GROSS DESCRIPTION: NORMAL
CONVERTED MICROSCOPIC DESCRIPTION: NORMAL

## 2023-09-29 VITALS — HEIGHT: 67 IN | BODY MASS INDEX: 21.14 KG/M2 | WEIGHT: 134.7 LBS

## 2023-09-30 NOTE — H&P
History & Physical Reviewed:   I have reviewed the History and Physical dated:  22-Aug-2023   History and Physical reviewed and relevant findings noted. Patient examined to review pertinent physical  findings.: No significant changes   Home Medications Reviewed: no changes noted   Allergies Reviewed: no changes noted       ERAS (Enhanced Recovery After Surgery):  ·  ERAS Patient: no     Consent:   COVID-19 Consent:  ·  COVID-19 Risk Consent Surgeon has reviewed key risks related to the risk of mani COVID-19 and if they contract COVID-19 what the risks are.       Electronic Signatures:  Lg Stoddard)  (Signed 23-Aug-2023 10:48)   Authored: History & Physical Reviewed, ERAS, Consent,  Note Completion      Last Updated: 23-Aug-2023 10:48 by Lg Stoddard)

## 2023-09-30 NOTE — H&P
History of Present Illness:   HPI:    ALTHEA DURHAM is a 72 year old Male who presents for bilateral inguinal hernia repair.        Past medical history/    Prior history of alcohol abuse    Elevated lipids  Hypertension  Arthritis and back pain        Social/    Ex-smoker    Intermittent problems with alcohol, has not had a drink for 5 months        Family/    Noncontributory    Comorbidities:   Comorbidites:  ·  Comorbid Conditions hypertension            Allergies:  ·  amlodipine : Rash  ·  Skelaxin : Unknown  ·  NSAIDs : Resp Distress  ·  ibuprofen : Unknown  ·  Indocin : Unknown  ·  metoprolol : Unknown  ·  Cymbalta : Unknown  ·  irbesartan : Unknown  ·  lisinopril : Unknown    Medications Prior to Admission:   Admission Medication Reconciliation has not been completed for this patient.    Review of Systems:   Constitutional: NEGATIVE: Fever, Chills, Anorexia,  Weight Loss, Malaise     Eyes: NEGATIVE: Blurry Vision, Drainage, Diploplia,  Redness, Vision Loss/ Change     ENMT: NEGATIVE: Nasal Discharge, Nasal Congestion,  Ear Pain, Mouth Pain, Throat Pain     Respiratory: NEGATIVE: Dry Cough, Productive Cough,  Hemoptysis, Wheezing, Shortness of Breath     Cardiac: NEGATIVE: Chest Pain, Dyspnea on Exertion,  Orthopnea, Palpitations, Syncope     Gastrointestinal: NEGATIVE: Nausea, Vomiting, Diarrhea,  Constipation, Abdominal Pain     Genitourinary: NEGATIVE: Discharge, Dysuria, Flank  Pain, Frequency, Hematuria     Musculoskeletal: NEGATIVE: Decreased ROM, Pain, Swelling,  Stiffness, Weakness     Neurological: NEGATIVE: Dizziness, Confusion, Headache,  Seizures, Syncope     Psychiatric: NEGATIVE: Mood Changes, Anxiety, Hallucinations,  Sleep Changes, Suicidal Ideas     Skin: NEGATIVE: Mass, Pain, Pruritus, Rash, Ulcer       Objective:   Physical Exam by System:    Constitutional: Well developed, awake/alert/oriented  x3, no distress, alert and cooperative   Eyes: PERRL, EOMI, clear sclera   ENMT: mucous  membranes moist, no apparent injury,  no lesions seen   Head/Neck: Neck supple, no apparent injury, thyroid  without mass or tenderness, No JVD, trachea midline, no bruits   Respiratory/Thorax: Patent airways, CTAB, normal  breath sounds with good chest expansion, thorax symmetric   Cardiovascular: Regular, rate and rhythm, no murmurs,  2+ equal pulses of the extremities, normal S 1and S 2   Gastrointestinal: Left greater than right bilateral  inguinal hernia   Musculoskeletal: ROM intact, no joint swelling, normal  strength   Extremities: normal extremities, no cyanosis edema,  contusions or wounds, no clubbing   Skin: Warm and dry, no lesions, no rashes     Assessment and Plan:   Assessment:    Assessment/    Bilateral inguinal hernia        Plan/    We will proceed to bilateral open repair at the patient's convenience.    The risks benefits indications for surgery reviewed with the patient.  Potential bleeding infection MI PE death etc. reviewed.  The anticipated convalescence is reviewed.  Use of mesh and prosthetic materials is reviewed.  All questions were answered  and consent is obtained      Electronic Signatures:  Lg Stoddard)  (Signed 22-Aug-2023 09:14)   Authored: History of Present Illness, Comorbidities,  Allergies, Medications Prior to Admission, Review of Systems, Objective, Assessment and Plan, Note Completion      Last Updated: 22-Aug-2023 09:14 by Lg Stoddard)

## 2023-10-01 NOTE — OP NOTE
Post Operative Note:     PreOp Diagnosis: Left greater than right bilateral  inguinal hernia   Post-Procedure Diagnosis: Same   Procedure: 1.  Bilateral open inguinal hernia repair  2.   3.   4.   5.   Surgeon: Richi   Resident/Fellow/Other Assistant:    Anesthesia: General   Estimated Blood Loss (mL): Minimal   Specimen: no   Findings: Left direct, right direct     Operative Report Dictated:  Dictation: not applicable - note contains Operative  Report   Operative Report:    Patient is generally male with bilateral hernias.  He presents now for elective repair.  Risks benefits and indications for surgery of been reviewed.  Questions were  answered and consent was obtained.    Patient was taken to the operating room.  General anesthesia obtained.  Placed on table in supine position.  Both groins were shaved prepped and draped in a sterile fashion.  Timeout procedures were followed.  Antibiotics were given.  DVT prophylaxis  obtain using subcutaneous heparin placement external med compression stockings.    Attention was first directed to the left side.  Standard left inguinal hernia incision was made.  Dissection was carried down through the skin and subcutaneous tissues and Diallo's fascia to the level of the external Bleich.  The external was opened in  direction of its fibers.  Cord was mobilized.  Cord was grossly unremarkable.  Again no lipoma or indirect component.  There was a direct defect immediately medial to the epigastric vessels.  This was held in a reduced position and the oversewn.    A 3 x 6 inch piece of flat polypropylene mesh was brought onto the field.  Sinker to the pubic tubercle, origins of Eddie's ligament, and the origins of conjoined tendon using several interrupted 0 Prolene sutures.  A double-armed 2-0 Prolene was used  to further anchor the mesh to the iliopubic tract and shelving edge of the ligament.  The second arm of the 2-0 Prolene was used to anchor the mesh in a running  fashion to the lateral edge conjoined tendon and the superior margin of transversalis arch.   Laterally a transverse slit was made in the mesh so slow passage of the cord.  Defect in the mesh was tightened with several interrupted Prolene sutures    Repair of suspected hemostasis was good counts were reported as correct subcutaneous tissues were irrigated made hemostatic.  External was closed with 2-0 Vicryl.  Diallo's was closed with 3-0 Vicryl.  Skin with staples.    Attention was now directed the right side.  Same incision exposure and dissection was carried out.  Findings were again pertinent for direct defect.  This was more medially at the tubercle.  Again there was no indirect or light component or lipoma.  Again  mesh was used to repair this with the same closure.    Patient tolerated the procedures well      Electronic Signatures:  Lg Stoddard)  (Signed 23-Aug-2023 13:11)   Authored: Post Operative Note, Note Completion      Last Updated: 23-Aug-2023 13:11 by Lg Stoddard)

## 2023-10-03 ENCOUNTER — OFFICE VISIT (OUTPATIENT)
Dept: SURGERY | Facility: CLINIC | Age: 73
End: 2023-10-03
Payer: MEDICARE

## 2023-10-03 VITALS — HEART RATE: 56 BPM | DIASTOLIC BLOOD PRESSURE: 76 MMHG | TEMPERATURE: 97.8 F | SYSTOLIC BLOOD PRESSURE: 138 MMHG

## 2023-10-03 DIAGNOSIS — K40.90 INGUINAL HERNIA WITHOUT OBSTRUCTION OR GANGRENE, RECURRENCE NOT SPECIFIED, UNSPECIFIED LATERALITY: Primary | ICD-10-CM

## 2023-10-03 PROCEDURE — 1126F AMNT PAIN NOTED NONE PRSNT: CPT | Performed by: SURGERY

## 2023-10-03 PROCEDURE — 3008F BODY MASS INDEX DOCD: CPT | Performed by: SURGERY

## 2023-10-03 PROCEDURE — 99024 POSTOP FOLLOW-UP VISIT: CPT | Performed by: SURGERY

## 2023-10-03 PROCEDURE — 1160F RVW MEDS BY RX/DR IN RCRD: CPT | Performed by: SURGERY

## 2023-10-03 PROCEDURE — 3075F SYST BP GE 130 - 139MM HG: CPT | Performed by: SURGERY

## 2023-10-03 PROCEDURE — 3078F DIAST BP <80 MM HG: CPT | Performed by: SURGERY

## 2023-10-03 PROCEDURE — 1159F MED LIST DOCD IN RCRD: CPT | Performed by: SURGERY

## 2023-10-03 PROCEDURE — 1036F TOBACCO NON-USER: CPT | Performed by: SURGERY

## 2023-10-03 NOTE — PROGRESS NOTES
Hal Gregorio   [id number]   1950       Chief Complaint/  Wound check    HPI/      Status post bilateral inguinal hernia repair.  Patient had a large hematoma left groin.  This was opened and evacuated.    Patient turns in follow-up he says the wounds completely closed    Denies any aches or pains        Past Medical History:   Diagnosis Date    Anxiety disorder, unspecified     Chronic anxiety    Central retinal vein occlusion, unspecified eye, stable     Retinal vein occlusion    Cervicalgia     Cervicalgia    Deficiency of other specified B group vitamins 02/17/2020    B12 deficiency    Elevated erythrocyte sedimentation rate     Elevated erythrocyte sedimentation rate    Fever, unspecified     Fever of unknown origin    Generalized anxiety disorder 03/21/2013    Generalized anxiety disorder    Other conditions influencing health status     Methicillin Resistant Staphylococcus Aureus Infection    Other malaise     Malaise    Pain in right shoulder     Pain in joint of right shoulder    Personal history of diseases of the blood and blood-forming organs and certain disorders involving the immune mechanism     History of iron deficiency anemia    Personal history of other diseases of the circulatory system     History of hypertension    Personal history of other diseases of the musculoskeletal system and connective tissue     History of polymyalgia rheumatica    Personal history of other endocrine, nutritional and metabolic disease     History of hyperglycemia    Personal history of other specified conditions     History of weakness    Personal history of other specified conditions     History of shortness of breath    Personal history of other specified conditions 03/21/2013    History of chest pain    Sedative, hypnotic or anxiolytic dependence, uncomplicated (CMS/HCC) 03/18/2022    Benzodiazepine dependence, continuous    Vitamin B12 deficiency anemia due to intrinsic factor deficiency     Pernicious anemia     Vitamin D deficiency, unspecified 02/17/2020    Vitamin D deficiency          Current Outpatient Medications:     amLODIPine (Norvasc) 2.5 mg tablet, Take 1 tablet (2.5 mg) by mouth once daily., Disp: , Rfl:     azithromycin (Zithromax) 1 gram powder, Take 1 packet (1 g) by mouth 1 time., Disp: , Rfl:     benzonatate (Tessalon) 200 mg capsule, Take 1 capsule (200 mg) by mouth 3 times a day as needed for cough. TAKE 1 CAPSULE 3 TIMES DAILY AS NEEDED FOR COUGH., Disp: , Rfl:     cholecalciferol (Vitamin D-3) 25 MCG (1000 UT) capsule, Take 1 capsule (25 mcg) by mouth., Disp: , Rfl:     diphenhydrAMINE (BENADryl) 25 mg capsule, Take by mouth every 6 hours if needed for itching., Disp: , Rfl:     fluoride, sodium, (PreviDent) 1.1 % gel, Apply to teeth. use once DAILY before bedtime, Disp: , Rfl:     losartan (Cozaar) 50 mg tablet, Take 1 tablet (50 mg) by mouth once daily., Disp: , Rfl:     methylPREDNISolone (Medrol Dospak) 4 mg tablets, Take 1 tablet (4 mg) by mouth. Take as directed, Disp: , Rfl:     milk thistle 175 mg tablet, Take 1 tablet (175 mg) by mouth once daily., Disp: , Rfl:     multivitamin tablet, Take 1 tablet by mouth once daily., Disp: , Rfl:     traMADol (Ultram) 50 mg tablet, Take 1 tablet (50 mg) by mouth every 8 hours if needed for severe pain (7 - 10)., Disp: , Rfl:     zinc sulfate (Zincate) 220 (50 Zn) MG capsule, Take by mouth., Disp: , Rfl:      Allergies   Allergen Reactions    Nsaids (Non-Steroidal Anti-Inflammatory Drug) Shortness of breath    Duloxetine Unknown    Indomethacin Unknown    Irbesartan Other and Unknown     Dry mouth    Lisinopril Unknown     ineffective    Metaxalone Unknown     Stomach issues    Metoprolol Hives    Amlodipine Rash and Swelling        Guggul got joint  Should use your ureter under lunch    CT chest wo IV contrast  Narrative: Interpreted By:  LANCE SAHU MD, RUDOLPH  MRN: [id number]  Patient Name: ALTHEA DURHAM     STUDY:  CT CHEST WO CONTRAST;  7/31/2023  3:19 pm     INDICATION:  right lower lung opacity persistent after chronic cough.     COMPARISON:  Chest x-ray 07/20/2023     ACCESSION NUMBER(S):  [id number]     ORDERING CLINICIAN:  HERON HUTCHISON     TECHNIQUE:  Helical data acquisition of the chest was obtained  without IV  contrast material.  Images were reformatted in axial, coronal, and  sagittal planes.     FINDINGS:  LUNGS AND AIRWAYS:  The trachea and central airways are patent. No endobronchial lesion.     Lungs are clear. The chest x-ray opacity was artifactual. No pleural  effusions are present.     MEDIASTINUM AND DUNG, LOWER NECK AND AXILLA:  The visualized thyroid gland is within normal limits.     No evidence of thoracic lymphadenopathy by CT criteria.     Esophagus appears within normal limits as seen.     HEART AND VESSELS:  The thoracic aorta is of normal course and caliber without vascular  calcifications.     Main pulmonary artery and its branches are normal in caliber.     Minimal coronary artery calcifications are seen. The study is not  optimized for evaluation of coronary arteries.     The cardiac chambers are not enlarged. Right atrium is indented  anteriorly by a pectus excavatum deformity.     No evidence of pericardial effusion.     UPPER ABDOMEN:  The visualized subdiaphragmatic structures demonstrate no remarkable  findings.     CHEST WALL AND OSSEOUS STRUCTURES:  Pectus excavatum deformity is present. Dextrocurvature is also noted  in the upper to midthoracic spine. Diffuse bridging endplate  osteophytes are present. There are no compression deformities.     Impression: 1.  There is no pulmonary infiltrate or mass. The chest x-ray density  was artifactual related to projection.  2. Pectus excavatum and scoliosis     MACRO:  None         /76 (BP Location: Left arm, Patient Position: Sitting, BP Cuff Size: Adult)   Pulse 56   Temp 36.6 °C (97.8 °F) (Temporal)      Physical Exam  Abdominal:      Comments: Left groin wound is closed.   Little bit of scabbing.  No drainage.  No swelling              Assessment/    Doing well      Plan/      Patient released to unrestricted activity.    RTC as needed

## 2023-10-16 ENCOUNTER — OFFICE VISIT (OUTPATIENT)
Dept: PRIMARY CARE | Facility: CLINIC | Age: 73
End: 2023-10-16
Payer: MEDICARE

## 2023-10-16 VITALS
SYSTOLIC BLOOD PRESSURE: 112 MMHG | BODY MASS INDEX: 21.05 KG/M2 | HEART RATE: 75 BPM | OXYGEN SATURATION: 98 % | RESPIRATION RATE: 16 BRPM | DIASTOLIC BLOOD PRESSURE: 60 MMHG | HEIGHT: 66 IN | WEIGHT: 131 LBS

## 2023-10-16 DIAGNOSIS — K76.0 HEPATIC STEATOSIS: ICD-10-CM

## 2023-10-16 DIAGNOSIS — D64.9 NORMOCYTIC ANEMIA: ICD-10-CM

## 2023-10-16 DIAGNOSIS — Z00.00 HEALTHCARE MAINTENANCE: ICD-10-CM

## 2023-10-16 DIAGNOSIS — I73.9 PVD (PERIPHERAL VASCULAR DISEASE) (CMS-HCC): ICD-10-CM

## 2023-10-16 DIAGNOSIS — R13.10 DYSPHAGIA, UNSPECIFIED TYPE: Primary | ICD-10-CM

## 2023-10-16 DIAGNOSIS — F10.90 ALCOHOL USE DISORDER: ICD-10-CM

## 2023-10-16 DIAGNOSIS — I10 PRIMARY HYPERTENSION: ICD-10-CM

## 2023-10-16 DIAGNOSIS — G89.29 CHRONIC LOW BACK PAIN WITH SCIATICA, SCIATICA LATERALITY UNSPECIFIED, UNSPECIFIED BACK PAIN LATERALITY: ICD-10-CM

## 2023-10-16 DIAGNOSIS — E53.8 B12 DEFICIENCY: ICD-10-CM

## 2023-10-16 DIAGNOSIS — G89.29 CHRONIC NECK PAIN: ICD-10-CM

## 2023-10-16 DIAGNOSIS — M54.40 CHRONIC LOW BACK PAIN WITH SCIATICA, SCIATICA LATERALITY UNSPECIFIED, UNSPECIFIED BACK PAIN LATERALITY: ICD-10-CM

## 2023-10-16 DIAGNOSIS — E78.5 HYPERLIPIDEMIA, UNSPECIFIED HYPERLIPIDEMIA TYPE: ICD-10-CM

## 2023-10-16 DIAGNOSIS — M54.2 CHRONIC NECK PAIN: ICD-10-CM

## 2023-10-16 DIAGNOSIS — R35.1 NOCTURIA: ICD-10-CM

## 2023-10-16 DIAGNOSIS — K40.90 LEFT INGUINAL HERNIA: ICD-10-CM

## 2023-10-16 DIAGNOSIS — R74.8 ELEVATED LIVER ENZYMES: ICD-10-CM

## 2023-10-16 PROCEDURE — 1036F TOBACCO NON-USER: CPT | Performed by: STUDENT IN AN ORGANIZED HEALTH CARE EDUCATION/TRAINING PROGRAM

## 2023-10-16 PROCEDURE — 3074F SYST BP LT 130 MM HG: CPT | Performed by: STUDENT IN AN ORGANIZED HEALTH CARE EDUCATION/TRAINING PROGRAM

## 2023-10-16 PROCEDURE — 3078F DIAST BP <80 MM HG: CPT | Performed by: STUDENT IN AN ORGANIZED HEALTH CARE EDUCATION/TRAINING PROGRAM

## 2023-10-16 PROCEDURE — 3008F BODY MASS INDEX DOCD: CPT | Performed by: STUDENT IN AN ORGANIZED HEALTH CARE EDUCATION/TRAINING PROGRAM

## 2023-10-16 PROCEDURE — 99214 OFFICE O/P EST MOD 30 MIN: CPT | Performed by: STUDENT IN AN ORGANIZED HEALTH CARE EDUCATION/TRAINING PROGRAM

## 2023-10-16 PROCEDURE — 1160F RVW MEDS BY RX/DR IN RCRD: CPT | Performed by: STUDENT IN AN ORGANIZED HEALTH CARE EDUCATION/TRAINING PROGRAM

## 2023-10-16 PROCEDURE — 1159F MED LIST DOCD IN RCRD: CPT | Performed by: STUDENT IN AN ORGANIZED HEALTH CARE EDUCATION/TRAINING PROGRAM

## 2023-10-16 PROCEDURE — 1126F AMNT PAIN NOTED NONE PRSNT: CPT | Performed by: STUDENT IN AN ORGANIZED HEALTH CARE EDUCATION/TRAINING PROGRAM

## 2023-10-16 ASSESSMENT — ENCOUNTER SYMPTOMS
DIAPHORESIS: 0
DIARRHEA: 0
DIZZINESS: 0
LIGHT-HEADEDNESS: 0
NERVOUS/ANXIOUS: 0
CHILLS: 0
SHORTNESS OF BREATH: 0
NAUSEA: 0
FEVER: 0
VOMITING: 0
DYSPHORIC MOOD: 0

## 2023-10-16 NOTE — PROGRESS NOTES
"Subjective   Patient ID: Hal Gregorio is a 72 y.o. male who presents for Follow-up.    Pt is here today for 3 month follow up.    Seeing orthopedic associates for bilateral shoulder and arm pain. Reports symptoms were worse after hospitalization and restraints.     Reports trouble swallowing solids intermittently. Symptoms seem present for about 6-7 months. Food seems to get stuck immediately after swallowing. Symptoms unchanged over last 6-7 months. 3 lbs weight loss since 4/23. No heartburn. No hematemesis, no coffee ground emesis. No regurgitation of food.     Feels urinating more at night about 2 times a night. No pain with urination. Stops drinking coffee at 7 PM, occasionally drinks soft drink or tomato juice or water at night prior to sleep.     Feels it takes him a little longer to adjust from heat to cold and cold to heat. Symptoms present for about 5-6 months ago.          Review of Systems   Constitutional:  Negative for chills, diaphoresis and fever.   HENT:  Negative for hearing loss.    Eyes:  Negative for visual disturbance.   Respiratory:  Negative for shortness of breath.    Cardiovascular:  Negative for chest pain.   Gastrointestinal:  Negative for diarrhea, nausea and vomiting.   Endocrine: Negative for cold intolerance and heat intolerance.   Neurological:  Negative for dizziness and light-headedness.   Psychiatric/Behavioral:  Negative for dysphoric mood. The patient is not nervous/anxious.        Objective   /60   Pulse 75   Resp 16   Ht 1.676 m (5' 6\")   Wt 59.4 kg (131 lb)   SpO2 98%   BMI 21.14 kg/m²     Physical Exam  Vitals reviewed.   Constitutional:       General: He is not in acute distress.     Appearance: Normal appearance.   HENT:      Head: Normocephalic.   Cardiovascular:      Rate and Rhythm: Normal rate and regular rhythm.   Pulmonary:      Effort: Pulmonary effort is normal. No respiratory distress.      Breath sounds: Normal breath sounds.   Abdominal:      " General: There is no distension.   Musculoskeletal:         General: No deformity.   Skin:     Coloration: Skin is not jaundiced.   Neurological:      Mental Status: He is alert.         Assessment/Plan   Problem List Items Addressed This Visit       B12 deficiency    Chronic low back pain    Hyperlipidemia    PVD (peripheral vascular disease) (CMS/HCC)    Hypertension    Healthcare maintenance    Alcohol use disorder    Normocytic anemia    Elevated liver enzymes    Left inguinal hernia    Chronic neck pain    Dysphagia - Primary    Relevant Orders    Referral to ENT    Hepatic steatosis    Relevant Orders    Referral to Hepatology    US abdomen limited liver    Nocturia   B12 deficiency  - Repeat B12 showed it was low  - On oral b12 1000mg/day, advised to obtain repeat.    Chronic low back pain  - Discussed needs to see pain management and neurosurgery. Doesn't want to see pain management. Discussed can refer to another pain management doctor if interested and he declined.     Bilateral shoulder pain  -Seeing orthopedics    Hepatic steatosis  -Noted on 9/22 CT abdomen/pelvis  -Referred to hepatology and ordered u/s liver. Possibly from alcohol.     Hyperlipidemia  - Continue follow-up with cardiology, management per cardiology  -Repeat lipid panel ordered    Peripheral vascular disease  - Continue follow-up with cardiology    Hypertension  - Controlled on losartan and amlodipine    Alcohol use disorder  - Recommended to follow-up with alcoholic Anonymous previously, congratulated efforts on quitting alcohol    Normocytic anemia  - Improved on labs and discussed to obtain repeat now    Elevated liver enzymes  - Repeat CMP normal    Inguinal hernia  - Status post bilateral inguinal hernia repair with surgery    Right lower lobe lung consolidation  - Had x-ray and CT chest which showed it resolved/was artifact.    Dysphagia  -Referred to ENT. Doesn't seem to be GI etiology.     Chronic neck pain  - Advise follow-up  with neurosurgery previously.    Nocturia  - PSA normal. Avoid drinking fluids late at night, can consider flomax or urology referral if still problematic. He isn't interested in flomax as he feels it is mild.  Will let us know if persistent.     Feels he adjusts slowly to temperature changes-will repeat labs    Advised f/u 3-4 months but he will follow up in 6 months at his decision. Discussed f/u sooner if needed.

## 2023-10-17 ENCOUNTER — LAB (OUTPATIENT)
Dept: LAB | Facility: LAB | Age: 73
End: 2023-10-17
Payer: MEDICARE

## 2023-10-17 DIAGNOSIS — E53.8 B12 DEFICIENCY: ICD-10-CM

## 2023-10-17 DIAGNOSIS — Z00.00 HEALTHCARE MAINTENANCE: ICD-10-CM

## 2023-10-17 DIAGNOSIS — D64.9 NORMOCYTIC ANEMIA: ICD-10-CM

## 2023-10-17 LAB
BASOPHILS # BLD AUTO: 0.06 X10*3/UL (ref 0–0.1)
BASOPHILS NFR BLD AUTO: 0.9 %
CHOLEST SERPL-MCNC: 134 MG/DL (ref 0–199)
CHOLESTEROL/HDL RATIO: 2.7
EOSINOPHIL # BLD AUTO: 0.25 X10*3/UL (ref 0–0.4)
EOSINOPHIL NFR BLD AUTO: 3.9 %
ERYTHROCYTE [DISTWIDTH] IN BLOOD BY AUTOMATED COUNT: 14 % (ref 11.5–14.5)
HCT VFR BLD AUTO: 39 % (ref 41–52)
HDLC SERPL-MCNC: 50.2 MG/DL
HGB BLD-MCNC: 12.7 G/DL (ref 13.5–17.5)
IMM GRANULOCYTES # BLD AUTO: 0.01 X10*3/UL (ref 0–0.5)
IMM GRANULOCYTES NFR BLD AUTO: 0.2 % (ref 0–0.9)
LDLC SERPL CALC-MCNC: 70 MG/DL
LYMPHOCYTES # BLD AUTO: 0.97 X10*3/UL (ref 0.8–3)
LYMPHOCYTES NFR BLD AUTO: 15.2 %
MCH RBC QN AUTO: 28.5 PG (ref 26–34)
MCHC RBC AUTO-ENTMCNC: 32.6 G/DL (ref 32–36)
MCV RBC AUTO: 88 FL (ref 80–100)
MONOCYTES # BLD AUTO: 0.71 X10*3/UL (ref 0.05–0.8)
MONOCYTES NFR BLD AUTO: 11.1 %
NEUTROPHILS # BLD AUTO: 4.37 X10*3/UL (ref 1.6–5.5)
NEUTROPHILS NFR BLD AUTO: 68.7 %
NON HDL CHOLESTEROL: 84 MG/DL (ref 0–149)
NRBC BLD-RTO: 0 /100 WBCS (ref 0–0)
PLATELET # BLD AUTO: 202 X10*3/UL (ref 150–450)
PMV BLD AUTO: 10.8 FL (ref 7.5–11.5)
RBC # BLD AUTO: 4.45 X10*6/UL (ref 4.5–5.9)
TRIGL SERPL-MCNC: 67 MG/DL (ref 0–149)
VIT B12 SERPL-MCNC: 485 PG/ML (ref 211–911)
VLDL: 13 MG/DL (ref 0–40)
WBC # BLD AUTO: 6.4 X10*3/UL (ref 4.4–11.3)

## 2023-10-17 PROCEDURE — 85025 COMPLETE CBC W/AUTO DIFF WBC: CPT

## 2023-10-17 PROCEDURE — 83550 IRON BINDING TEST: CPT

## 2023-10-17 PROCEDURE — 80061 LIPID PANEL: CPT

## 2023-10-17 PROCEDURE — 36415 COLL VENOUS BLD VENIPUNCTURE: CPT

## 2023-10-17 PROCEDURE — 82746 ASSAY OF FOLIC ACID SERUM: CPT

## 2023-10-17 PROCEDURE — 83540 ASSAY OF IRON: CPT

## 2023-10-17 PROCEDURE — 82728 ASSAY OF FERRITIN: CPT

## 2023-10-17 PROCEDURE — 82607 VITAMIN B-12: CPT

## 2023-10-19 DIAGNOSIS — D64.9 NORMOCYTIC ANEMIA: Primary | ICD-10-CM

## 2023-10-19 LAB
FERRITIN SERPL-MCNC: 184 NG/ML (ref 20–300)
FOLATE SERPL-MCNC: >22.3 NG/ML
IRON SATN MFR SERPL: 14 % (ref 25–45)
IRON SERPL-MCNC: 40 UG/DL (ref 35–150)
TIBC SERPL-MCNC: 291 UG/DL (ref 240–445)
UIBC SERPL-MCNC: 251 UG/DL (ref 110–370)

## 2023-10-20 RX ORDER — FERROUS SULFATE 325(65) MG
65 TABLET ORAL 3 TIMES WEEKLY
Qty: 12 TABLET | Refills: 2 | Status: SHIPPED | OUTPATIENT
Start: 2023-10-20 | End: 2024-01-12

## 2023-10-24 ENCOUNTER — TELEPHONE (OUTPATIENT)
Dept: PRIMARY CARE | Facility: CLINIC | Age: 73
End: 2023-10-24
Payer: MEDICARE

## 2023-10-25 ENCOUNTER — APPOINTMENT (OUTPATIENT)
Dept: OTOLARYNGOLOGY | Facility: CLINIC | Age: 73
End: 2023-10-25
Payer: MEDICARE

## 2023-10-26 ENCOUNTER — OFFICE VISIT (OUTPATIENT)
Dept: OTOLARYNGOLOGY | Facility: CLINIC | Age: 73
End: 2023-10-26
Payer: MEDICARE

## 2023-10-26 VITALS
TEMPERATURE: 97.5 F | HEIGHT: 66 IN | DIASTOLIC BLOOD PRESSURE: 81 MMHG | WEIGHT: 128.2 LBS | BODY MASS INDEX: 20.6 KG/M2 | SYSTOLIC BLOOD PRESSURE: 142 MMHG

## 2023-10-26 DIAGNOSIS — T17.308A CHOKING, INITIAL ENCOUNTER: Primary | ICD-10-CM

## 2023-10-26 DIAGNOSIS — R13.10 DYSPHAGIA, UNSPECIFIED TYPE: ICD-10-CM

## 2023-10-26 PROCEDURE — 3079F DIAST BP 80-89 MM HG: CPT | Performed by: OTOLARYNGOLOGY

## 2023-10-26 PROCEDURE — 3008F BODY MASS INDEX DOCD: CPT | Performed by: OTOLARYNGOLOGY

## 2023-10-26 PROCEDURE — 1160F RVW MEDS BY RX/DR IN RCRD: CPT | Performed by: OTOLARYNGOLOGY

## 2023-10-26 PROCEDURE — 99204 OFFICE O/P NEW MOD 45 MIN: CPT | Performed by: OTOLARYNGOLOGY

## 2023-10-26 PROCEDURE — 3077F SYST BP >= 140 MM HG: CPT | Performed by: OTOLARYNGOLOGY

## 2023-10-26 PROCEDURE — 1126F AMNT PAIN NOTED NONE PRSNT: CPT | Performed by: OTOLARYNGOLOGY

## 2023-10-26 PROCEDURE — 31575 DIAGNOSTIC LARYNGOSCOPY: CPT | Performed by: OTOLARYNGOLOGY

## 2023-10-26 PROCEDURE — 1036F TOBACCO NON-USER: CPT | Performed by: OTOLARYNGOLOGY

## 2023-10-26 PROCEDURE — 1159F MED LIST DOCD IN RCRD: CPT | Performed by: OTOLARYNGOLOGY

## 2023-10-26 NOTE — PROGRESS NOTES
Impression:  1. Choking, initial encounter        2. Dysphagia, unspecified type  Referral to ENT         RECOMMENDATIONS/PLAN :  I reassured the patient there is no evidence of any mass or swelling along his upper airway however he does have some thickened saliva that is pooling in his piriform sinuses.  I want him to drink more water and cut back on his dairy intake.  We will set him up with a modified barium swallow with the speech-language pathologist present and as soon as I get those results I will call him and make further recommendations.  In the meantime he will continue to chew up his food and drink plenty of water as he is eating and drinking.  All of his concerns were addressed today.      **This electronic medical record note was created with the use of voice recognition software.  Despite proofreading, typographical or grammatical errors may be present that could affect meaning of content **    Subjective   Patient ID:     Hal Gregorio is a 72 y.o. male who presents to the office today complaining of difficulty swallowing with occasional choking.  He states that sometimes it feels like food is getting stuck low in the throat.  He denies specific coughing when eating or drinking.  He denies any hemoptysis or hematemesis.  He has not had any wheezing or pain in the throat.    ROS:  A detailed 12 system review of systems is noted on the intake form has been reviewed with the patient with details noted in the HPI and scanned into the patient's medical record.    Objective     Past Medical History:   Diagnosis Date    Anxiety disorder, unspecified     Chronic anxiety    Central retinal vein occlusion, unspecified eye, stable     Retinal vein occlusion    Cervicalgia     Cervicalgia    Deficiency of other specified B group vitamins 02/17/2020    B12 deficiency    Elevated erythrocyte sedimentation rate     Elevated erythrocyte sedimentation rate    Fever, unspecified     Fever of unknown origin     Generalized anxiety disorder 03/21/2013    Generalized anxiety disorder    Other conditions influencing health status     Methicillin Resistant Staphylococcus Aureus Infection    Other malaise     Malaise    Pain in right shoulder     Pain in joint of right shoulder    Personal history of diseases of the blood and blood-forming organs and certain disorders involving the immune mechanism     History of iron deficiency anemia    Personal history of other diseases of the circulatory system     History of hypertension    Personal history of other diseases of the musculoskeletal system and connective tissue     History of polymyalgia rheumatica    Personal history of other endocrine, nutritional and metabolic disease     History of hyperglycemia    Personal history of other specified conditions     History of weakness    Personal history of other specified conditions     History of shortness of breath    Personal history of other specified conditions 03/21/2013    History of chest pain    Sedative, hypnotic or anxiolytic dependence, uncomplicated (CMS/[facility]) 03/18/2022    Benzodiazepine dependence, continuous    Vitamin B12 deficiency anemia due to intrinsic factor deficiency     Pernicious anemia    Vitamin D deficiency, unspecified 02/17/2020    Vitamin D deficiency        Past Surgical History:   Procedure Laterality Date    BACK SURGERY  03/21/2013    Back Surgery    HAND SURGERY  03/21/2013    Hand Surgery                                                                                                                                                          KNEE SURGERY  03/21/2013    Knee Surgery    NECK SURGERY  12/18/2017    Neck Surgery    OTHER SURGICAL HISTORY  03/10/2022    Hypertension        Allergies   Allergen Reactions    Nsaids (Non-Steroidal Anti-Inflammatory Drug) Shortness of breath    Duloxetine Unknown    Indomethacin Unknown    Irbesartan Other and Unknown     Dry mouth    Lisinopril Unknown      "ineffective    Metaxalone Unknown     Stomach issues    Metoprolol Hives    Amlodipine Rash and Swelling          Current Outpatient Medications:     amLODIPine (Norvasc) 2.5 mg tablet, Take 1 tablet (2.5 mg) by mouth once daily., Disp: , Rfl:     cholecalciferol (Vitamin D-3) 25 MCG (1000 UT) capsule, Take 1 capsule (25 mcg) by mouth., Disp: , Rfl:     diphenhydrAMINE (BENADryl) 25 mg capsule, Take by mouth every 6 hours if needed for itching., Disp: , Rfl:     ferrous sulfate 325 (65 Fe) MG tablet, Take 1 tablet (65 mg of iron) by mouth 3 (three) times a week., Disp: 12 tablet, Rfl: 2    fluoride, sodium, (PreviDent) 1.1 % gel, Apply to teeth. use once DAILY before bedtime, Disp: , Rfl:     losartan (Cozaar) 50 mg tablet, Take 1 tablet (50 mg) by mouth once daily., Disp: , Rfl:     multivitamin tablet, Take 1 tablet by mouth once daily., Disp: , Rfl:     zinc sulfate (Zincate) 220 (50 Zn) MG capsule, Take by mouth., Disp: , Rfl:      Tobacco Use: Medium Risk (10/26/2023)    Patient History     Smoking Tobacco Use: Former     Smokeless Tobacco Use: Never     Passive Exposure: Past        Alcohol Use: Not on file        Social History     Substance and Sexual Activity   Drug Use Not Currently    Types: Marijuana        Physical Exam:  Visit Vitals  /81   Temp 36.4 °C (97.5 °F) (Temporal)   Ht 1.676 m (5' 6\")   Wt 58.2 kg (128 lb 3.2 oz)   BMI 20.69 kg/m²   Smoking Status Former   BSA 1.65 m²      General: Patient is alert, oriented, cooperative in no apparent distress.  Head: Normocephalic, atraumatic.  Eyes: PERRL, EOMI, Conjunctiva is clear. No nystagmus.  Ears: Right Ear-- Pinna is normal.  External auditory canal reveals some cerumen.  Tympanic membrane is [intact, translucent and has good mobility with my pneumatic otoscope. No effusion].  Mastoid is nontender.  Left ear-- Pinna is normal.  External auditory canal reveals some cerumen.  Tympanic membrane is [intact, translucent and has good mobility with " my pneumatic otoscope.  No effusion].  Mastoid is nontender.  Nose: Septum is relatively straight.  No septal perforation or lesions. No septal hematoma/ seroma.  No signs of bleeding.  Inferior turbinates are mildly swollen.   No evidence of intranasal polyps.    Throat:  Floor of mouth is clear, no masses.  Tongue appears normal, no lesions or masses. Gums, gingiva, buccal mucosa appear pink and moist, no lesions. Teeth are in good repair.  No obvious dental infections.  Peritonsillar regions appear symmetric without swelling.  Hard and soft palate appear normal, no obvious cleft. Uvula is midline.  Oropharynx: No lesions. Retropharyngeal wall is flat.  No active postnasal drip.  Neck: Supple,  no lymphadenopathy.  No masses.  Salivary Glands: Symmetric bilaterally.  No palpable masses.  No evidence of acute infection or salivary stones  Neurologic: Cranial Nerves 2-12 are grossly intact without focal deficits. Cerebellar function testing is normal.     Results:   []  Procedure:   Pre Op Diagnosis: Difficulty swallowing with occasional choking on food-rule out upper airway mass  Post Op Diagnosis: Same  Procedure: Flexible Nasopharyngolaryngoscopy  Surgeon: Shaun Bolaños DO  Assist: None  Anesthesia: Topical Lidocaine 4%/ 0.05% Afrin 1:1 mixture  EBL: None  Complications: None  Disposition: The patient tolerated the procedure well. There were no complications.    Procedure:  After informed consent was obtained with the risks, benefits, complications and alternatives explained to the patient/ guardian, the patient was sat up in the ENT chair and the nose was anesthetized and decongested with topical  4% lidocaine and 0.05% Afrin. After allowing this to take effect, the flexible nasopharyngoscope was advanced thru the nostrils and down to the larynx. The following areas were visualized:  The nasal passages, septum, nasopharynx, sinus ostia, base of tongue, epiglottis, true and false vocal folds, arytenoids, post  cricoid region and subglottis were all examined and found to be normal except as follows:    Septum is relatively straight.  Ostiomeatal complexes are clear bilaterally.  No pus polyps or lesions.  Nasopharynx is clear.  No masses.  Base of tongue clear.  Epiglottis is normal.  True and false vocal cords are approximating equally bilaterally.  No nodules polyps or lesions.  He does have some pooling of secretions/saliva in his piriform sinuses.  Subglottis is clear.  Arytenoids appear normal.      The patient tolerated the procedure well and there were no complications.      Shaun Bolaños, DO

## 2023-11-02 ENCOUNTER — HOSPITAL ENCOUNTER (OUTPATIENT)
Dept: RADIOLOGY | Facility: HOSPITAL | Age: 73
Discharge: HOME | End: 2023-11-02
Payer: MEDICARE

## 2023-11-02 DIAGNOSIS — T17.308A CHOKING, INITIAL ENCOUNTER: ICD-10-CM

## 2023-11-02 DIAGNOSIS — R13.10 DYSPHAGIA, UNSPECIFIED TYPE: ICD-10-CM

## 2023-11-02 PROCEDURE — 2500000001 HC RX 250 WO HCPCS SELF ADMINISTERED DRUGS (ALT 637 FOR MEDICARE OP): Performed by: OTOLARYNGOLOGY

## 2023-11-02 PROCEDURE — 92611 MOTION FLUOROSCOPY/SWALLOW: CPT | Mod: GN

## 2023-11-02 PROCEDURE — 92526 ORAL FUNCTION THERAPY: CPT | Mod: GN

## 2023-11-02 PROCEDURE — 74230 X-RAY XM SWLNG FUNCJ C+: CPT

## 2023-11-02 PROCEDURE — 74230 X-RAY XM SWLNG FUNCJ C+: CPT | Performed by: RADIOLOGY

## 2023-11-02 RX ADMIN — BARIUM SULFATE 5 ML: 400 PASTE ORAL at 11:01

## 2023-11-02 RX ADMIN — BARIUM SULFATE 60 ML: 0.81 POWDER, FOR SUSPENSION ORAL at 11:00

## 2023-11-02 RX ADMIN — BARIUM SULFATE 700 MG: 700 TABLET ORAL at 11:02

## 2023-11-02 RX ADMIN — BARIUM SULFATE 10 ML: 400 SUSPENSION ORAL at 11:01

## 2023-11-02 NOTE — PROCEDURES
"Speech-Language Pathology    SLP Outpatient Modified Barium Swallow Study (MBSS)    Patient Name: Hal Gregorio  MRN: [id number]  Today's Date: 11/2/2023   Time Calculation  Start Time: 0830  Stop Time: 0915  Time Calculation (min): 45 min         Current Problem:   1. Dysphagia, unspecified type  FL modified barium swallow study    FL modified barium swallow study      2. Choking, initial encounter  FL modified barium swallow study    FL modified barium swallow study        **Combined/full SLP & radiologist report for today's Modified Barium Swallow can be found under \"chart review tab, imaging tab and titled FL Modified Barium Swallow Study.\"       Assessment/Impression:  Mechanics of the swallow summary:  Oral phase: [WFL]  Pharyngeal phase: [Reduced anterior hyolaryngeal movement; residuals in vallecula and pyriforms (> with solids)]  Esophageal phase: [Reduced UES opening and/or timing of bolus transit through UES; mild-moderate retention of contrast in mid-upper esophagus upon esophageal sweep in the AP view]    Pt presents with mild pharyngeal dysphagia characterized by deficits noted above. Most significant deficits impacting swallowing safety and efficiency include reduced anterior hyolaryngeal movement and reduced UES opening resulting in pyriform sinus residue across all consistencies (> with solids). A bony growth is also observed to be impinging on cervical pharynx/esophagus around level of UES (C5); suspect this is contributing to reduced transit time of bolus through UES/reduced UES opening. An effortful swallow was intermittently successful in clearing residue after initial swallow, but did appear more successful in reducing solid residuals when instructed to complete an effortful swallow upon first swallow of bolus. A liquid wash is also successful in helping clear pharyngeal residue. No penetration/aspiration was observed during today’s MBSS.     Per the results of this assessment, recommend PO diet " of regular solids and thin liquids with strategies listed above.     Prognosis:  Prognosis for Safe Diet Advancement: Excellent    Plan:  Follow-up speech therapy recommended: [Yes.]  Frequency: x1/week  Duration: 6 visits    Short term goals:   1. The patient will complete pharyngeal neuromuscular exercises (effortful swallow, CTAR, Shaker) x20 reps independently in order to maximize pharyngeal/laryngeal strength/ROM/coordination to improve swallow function  2. The patient will demonstrate adequate use of compensatory strategies including, effortful swallow and alternate consistencies during at least 80% of observed swallows.  3. Patient will verbalize/demonstrate comprehension of dysphagia education, recommendations and home program.      Long term goals:   Improve overall pharyngeal strength/swallow function in order to reduce patient's reported symptoms and ultimately maintain optimal swallow function and safety with PO intake. ]    Recommendations/Treatment:  Compensatory Swallow Strategies:  -Effortful Swallow  -Alternate consistencies  -Added moisture to solids  -Chew food more thoroughly]    Treatment Provided today: [Yes]   ST provided extensive education to pt regarding anatomy/physiology of swallow function, risk of aspiration if residuals increase over time, and the use of compensatory swallow strategies to promote pt safety upon PO intake including hard swallow with sollids, alternate consistencies, and added moisture with solids.. In addition, ST provided instruction/training on pharyngeal exercises (re: effortful swallow) to facilitate bolus clearance from pharyngeal cavity upon PO intake.      Additional consult suggested: [YES - f/u with ENT; OP swallow tx for strengthening. If symptoms worsen overtime, may consider neurosurgical consult re: bony growth observed this date]     Repeat study/ dc plan: [As indicated by symtpoms]    Reason for referral:   Per order: Dysphagia; choking. Pt reports  "intermitent globus sensation with solids. No particular solid that he can pin point, but reports if happens more when he is watching TV or \"not paying attention.\" He feels food get caught in his throat and then has to take a drink or eat another bite of something to \"push it down.\" No reported difficulty with liquid or pills. Pt reports symptoms started after he had a seizure and fell and hit his head in February 2023. He reports, \"I was out for 3-4 days and had a tube down my nose.\"   Patient hx: [Pt denies any neurologic hx/diagnoses and denies any additional seizures. Upon first flouro image a plate/cervical fusion was observed at level of C5-C6. When asked about this patient reported, \"Oh I don't know when that was - a long time ago! Maybe over 20 years old?\"]  Respiratory status: [WFL]   Current diet: [Regular/thin]      General Visit Information:  Patient Class: Outpatient  Ordering Physician: Dr. Bolaños  Reason for Referral: \"dysphagia\"  Pain: 0/10  Certification Period Start: 11/2/2023  Certification Period End: 1/31/2024    Outpatient Education:  Yes  Results of today's study; recommendations and rationale for follow up; strategies     "

## 2023-11-03 ENCOUNTER — TELEPHONE (OUTPATIENT)
Dept: OTOLARYNGOLOGY | Facility: CLINIC | Age: 73
End: 2023-11-03
Payer: MEDICARE

## 2023-11-03 NOTE — TELEPHONE ENCOUNTER
I left a message for Hal on his voicemail and reviewed his results of his modified barium swallow with him.  There was no evidence of any mass or stricture.  Speech therapy did recommend follow-up with them and I advised him to call the office with any questions.  I will await his return call.

## 2023-12-12 ENCOUNTER — ANCILLARY PROCEDURE (OUTPATIENT)
Dept: RADIOLOGY | Facility: CLINIC | Age: 73
End: 2023-12-12
Payer: MEDICARE

## 2023-12-12 DIAGNOSIS — K76.0 HEPATIC STEATOSIS: ICD-10-CM

## 2023-12-12 PROCEDURE — 76705 ECHO EXAM OF ABDOMEN: CPT | Performed by: RADIOLOGY

## 2023-12-12 PROCEDURE — 76705 ECHO EXAM OF ABDOMEN: CPT

## 2023-12-14 DIAGNOSIS — K83.8 DILATED CBD, ACQUIRED: Primary | ICD-10-CM

## 2023-12-15 ENCOUNTER — LAB (OUTPATIENT)
Dept: LAB | Facility: LAB | Age: 73
End: 2023-12-15
Payer: MEDICARE

## 2023-12-15 DIAGNOSIS — K83.8 DILATED CBD, ACQUIRED: ICD-10-CM

## 2023-12-15 DIAGNOSIS — D64.9 NORMOCYTIC ANEMIA: ICD-10-CM

## 2023-12-15 LAB
ALBUMIN SERPL BCP-MCNC: 4.6 G/DL (ref 3.4–5)
ALP SERPL-CCNC: 74 U/L (ref 33–136)
ALT SERPL W P-5'-P-CCNC: 13 U/L (ref 10–52)
ANION GAP SERPL CALC-SCNC: 9 MMOL/L (ref 10–20)
AST SERPL W P-5'-P-CCNC: 19 U/L (ref 9–39)
BASOPHILS # BLD AUTO: 0.09 X10*3/UL (ref 0–0.1)
BASOPHILS NFR BLD AUTO: 1 %
BILIRUB SERPL-MCNC: 0.7 MG/DL (ref 0–1.2)
BUN SERPL-MCNC: 15 MG/DL (ref 6–23)
CALCIUM SERPL-MCNC: 9.3 MG/DL (ref 8.6–10.3)
CHLORIDE SERPL-SCNC: 100 MMOL/L (ref 98–107)
CO2 SERPL-SCNC: 34 MMOL/L (ref 21–32)
CREAT SERPL-MCNC: 0.91 MG/DL (ref 0.5–1.3)
EOSINOPHIL # BLD AUTO: 0.55 X10*3/UL (ref 0–0.4)
EOSINOPHIL NFR BLD AUTO: 6.3 %
ERYTHROCYTE [DISTWIDTH] IN BLOOD BY AUTOMATED COUNT: 13.9 % (ref 11.5–14.5)
GFR SERPL CREATININE-BSD FRML MDRD: 88 ML/MIN/1.73M*2
GLUCOSE SERPL-MCNC: 85 MG/DL (ref 74–99)
HCT VFR BLD AUTO: 40.4 % (ref 41–52)
HGB BLD-MCNC: 13.1 G/DL (ref 13.5–17.5)
IMM GRANULOCYTES # BLD AUTO: 0.02 X10*3/UL (ref 0–0.5)
IMM GRANULOCYTES NFR BLD AUTO: 0.2 % (ref 0–0.9)
LYMPHOCYTES # BLD AUTO: 1.2 X10*3/UL (ref 0.8–3)
LYMPHOCYTES NFR BLD AUTO: 13.7 %
MCH RBC QN AUTO: 28.7 PG (ref 26–34)
MCHC RBC AUTO-ENTMCNC: 32.4 G/DL (ref 32–36)
MCV RBC AUTO: 88 FL (ref 80–100)
MONOCYTES # BLD AUTO: 0.62 X10*3/UL (ref 0.05–0.8)
MONOCYTES NFR BLD AUTO: 7.1 %
NEUTROPHILS # BLD AUTO: 6.27 X10*3/UL (ref 1.6–5.5)
NEUTROPHILS NFR BLD AUTO: 71.7 %
NRBC BLD-RTO: 0 /100 WBCS (ref 0–0)
PLATELET # BLD AUTO: 208 X10*3/UL (ref 150–450)
POTASSIUM SERPL-SCNC: 3.7 MMOL/L (ref 3.5–5.3)
PROT SERPL-MCNC: 7.2 G/DL (ref 6.4–8.2)
RBC # BLD AUTO: 4.57 X10*6/UL (ref 4.5–5.9)
SODIUM SERPL-SCNC: 139 MMOL/L (ref 136–145)
WBC # BLD AUTO: 8.8 X10*3/UL (ref 4.4–11.3)

## 2023-12-15 PROCEDURE — 36415 COLL VENOUS BLD VENIPUNCTURE: CPT

## 2023-12-15 PROCEDURE — 80053 COMPREHEN METABOLIC PANEL: CPT

## 2023-12-15 PROCEDURE — 85025 COMPLETE CBC W/AUTO DIFF WBC: CPT

## 2023-12-18 ENCOUNTER — TELEPHONE (OUTPATIENT)
Dept: PRIMARY CARE | Facility: CLINIC | Age: 73
End: 2023-12-18
Payer: MEDICARE

## 2023-12-18 NOTE — TELEPHONE ENCOUNTER
----- Message from Lesia Lewis MD sent at 12/18/2023 10:26 AM EST -----  Patient of Ying. Will you let him know labs are stable from last check with Dr. He- he should keep routine followup with him

## 2023-12-26 ENCOUNTER — HOSPITAL ENCOUNTER (OUTPATIENT)
Dept: RADIOLOGY | Facility: HOSPITAL | Age: 73
Discharge: HOME | End: 2023-12-26
Payer: MEDICARE

## 2023-12-26 DIAGNOSIS — K83.8 DILATED CBD, ACQUIRED: ICD-10-CM

## 2023-12-27 ENCOUNTER — HOSPITAL ENCOUNTER (OUTPATIENT)
Dept: RADIOLOGY | Facility: HOSPITAL | Age: 73
Discharge: HOME | End: 2023-12-27
Payer: MEDICARE

## 2023-12-27 PROCEDURE — 74183 MRI ABD W/O CNTR FLWD CNTR: CPT | Performed by: RADIOLOGY

## 2023-12-27 PROCEDURE — 2550000001 HC RX 255 CONTRASTS: Performed by: STUDENT IN AN ORGANIZED HEALTH CARE EDUCATION/TRAINING PROGRAM

## 2023-12-27 PROCEDURE — 76376 3D RENDER W/INTRP POSTPROCES: CPT | Performed by: RADIOLOGY

## 2023-12-27 PROCEDURE — A9575 INJ GADOTERATE MEGLUMI 0.1ML: HCPCS | Performed by: STUDENT IN AN ORGANIZED HEALTH CARE EDUCATION/TRAINING PROGRAM

## 2023-12-27 PROCEDURE — 74183 MRI ABD W/O CNTR FLWD CNTR: CPT

## 2023-12-27 RX ORDER — GADOTERATE MEGLUMINE 376.9 MG/ML
11 INJECTION INTRAVENOUS
Status: COMPLETED | OUTPATIENT
Start: 2023-12-27 | End: 2023-12-27

## 2023-12-27 RX ADMIN — GADOTERATE MEGLUMINE 11 ML: 376.9 INJECTION INTRAVENOUS at 11:08

## 2023-12-28 ENCOUNTER — TELEPHONE (OUTPATIENT)
Dept: PRIMARY CARE | Facility: CLINIC | Age: 73
End: 2023-12-28
Payer: MEDICARE

## 2023-12-28 DIAGNOSIS — K83.8 COMMON BILE DUCT DILATION: ICD-10-CM

## 2023-12-28 DIAGNOSIS — K86.2 PANCREATIC CYST (HHS-HCC): Primary | ICD-10-CM

## 2023-12-28 NOTE — TELEPHONE ENCOUNTER
----- Message from Derrick He DO sent at 12/28/2023  8:06 AM EST -----  Please let him know there is a large pancreatic cyst seen-unsure if this is a benign cyst or cancerous. Radiology recommends this should be monitored every 6 months for 2 years or evaluation by GI for possible biopsy. I would like him to see GI to discuss these results with him further as this may need to be biopsied. I recommend Dr. Curry and he can call [phone] to schedule.

## 2023-12-28 NOTE — TELEPHONE ENCOUNTER
----- Message from Derrick He DO sent at 12/28/2023  8:06 AM EST -----  Please let him know there is a large pancreatic cyst seen-unsure if this is a benign cyst or cancerous. Radiology recommends this should be monitored every 6 months for 2 years or evaluation by GI for possible biopsy. I would like him to see GI to discuss these results with him further as this may need to be biopsied. I recommend Dr. Curry and he can call [phone] to schedule.

## 2024-01-02 ENCOUNTER — APPOINTMENT (OUTPATIENT)
Dept: RADIOLOGY | Facility: CLINIC | Age: 74
End: 2024-01-02
Payer: MEDICARE

## 2024-02-02 ENCOUNTER — OFFICE VISIT (OUTPATIENT)
Dept: GASTROENTEROLOGY | Facility: CLINIC | Age: 74
End: 2024-02-02
Payer: MEDICARE

## 2024-02-02 VITALS
TEMPERATURE: 97.3 F | SYSTOLIC BLOOD PRESSURE: 166 MMHG | BODY MASS INDEX: 20.46 KG/M2 | HEART RATE: 62 BPM | WEIGHT: 135 LBS | RESPIRATION RATE: 18 BRPM | OXYGEN SATURATION: 99 % | HEIGHT: 68 IN | DIASTOLIC BLOOD PRESSURE: 88 MMHG

## 2024-02-02 DIAGNOSIS — R74.8 LIVER ENZYME ELEVATION: ICD-10-CM

## 2024-02-02 DIAGNOSIS — K83.8 COMMON BILE DUCT DILATION: ICD-10-CM

## 2024-02-02 DIAGNOSIS — K59.09 OTHER CONSTIPATION: ICD-10-CM

## 2024-02-02 DIAGNOSIS — K86.2 PANCREATIC CYST (HHS-HCC): Primary | ICD-10-CM

## 2024-02-02 PROCEDURE — 3077F SYST BP >= 140 MM HG: CPT | Performed by: INTERNAL MEDICINE

## 2024-02-02 PROCEDURE — 1126F AMNT PAIN NOTED NONE PRSNT: CPT | Performed by: INTERNAL MEDICINE

## 2024-02-02 PROCEDURE — 3008F BODY MASS INDEX DOCD: CPT | Performed by: INTERNAL MEDICINE

## 2024-02-02 PROCEDURE — 1159F MED LIST DOCD IN RCRD: CPT | Performed by: INTERNAL MEDICINE

## 2024-02-02 PROCEDURE — 99204 OFFICE O/P NEW MOD 45 MIN: CPT | Performed by: INTERNAL MEDICINE

## 2024-02-02 PROCEDURE — 3079F DIAST BP 80-89 MM HG: CPT | Performed by: INTERNAL MEDICINE

## 2024-02-02 PROCEDURE — 1036F TOBACCO NON-USER: CPT | Performed by: INTERNAL MEDICINE

## 2024-02-02 NOTE — PROGRESS NOTES
REASON FOR VISIT: Abnormal MRI MRCP (pancreatic cyst)  HPI:  Hal Gregorio is a 73 y.o. male who presents for above problems.  He has past medical history of hypertension vitamin D deficiency, was referred to me due to abnormal MRI MRCP.  Patient does not have any complaint at this time, previously had intermittent abdominal pain while he was in the hospital for fall last year, had mild elevation of liver enzyme, ultrasound of the abdomen showed biliary duct dilation.  Follow-up MRI MRCP on December 2023 showed: Normal liver morphology no mass.  Bile duct the intra and extrahepatic biliary duct normal.  The common bile duct measured 4 mm with this most tapering toward ampulla.  There is a benign appearing filling defect involving the common hepatic duct correspond to flow voids from overlapping visceral no abnormal enhancement of the bile duct.  Gallbladder within normal limit.  Pancreas: Mildly atrophic.  Normal PD.  There are multiple communicating pancreatic cyst/dilated sidebranch IPMN at the level of pancreatic head neck measuring 20 x 2.7 x 1.1 cm.  Adjacent inferior portion measured 1.8 x 1.5 without any nodules or enhancing component.  Patient reports history of marijuana use, he does not smoke or use any drugs.  No personal family history of pancreatic disease or GI cancer  Had EGD and colonoscopy  outpatient, no report available.  Colonoscopy was done approximately 2 years ago he was advised to have a follow-up colonoscopy in 5 years.  I reviewed his recent LFTs on December 15, 2023 showed normal bilirubin and liver enzymes.    REVIEW OF SYSTEMS  Review of Systems   Constitutional: Negative.  Negative for activity change, appetite change, chills, fatigue, fever and unexpected weight change.   HENT: Negative.     Respiratory: Negative.     Cardiovascular: Negative.  Negative for chest pain and palpitations.   Gastrointestinal: Negative.  Negative for abdominal distention, abdominal pain, anal bleeding,  blood in stool, constipation, diarrhea, nausea, rectal pain and vomiting.   Skin: Negative.  Negative for color change and rash.   Neurological: Negative.  Negative for dizziness, tremors, seizures, weakness and headaches.   Psychiatric/Behavioral: Negative.  Negative for confusion.       Allergies   Allergen Reactions    Nsaids (Non-Steroidal Anti-Inflammatory Drug) Shortness of breath    Duloxetine Unknown    Indomethacin Unknown    Irbesartan Other and Unknown     Dry mouth    Lisinopril Unknown     ineffective    Metaxalone Unknown     Stomach issues    Metoprolol Hives    Amlodipine Rash and Swelling       Past Medical History:   Diagnosis Date    Anxiety disorder, unspecified     Chronic anxiety    Central retinal vein occlusion, unspecified eye, stable     Retinal vein occlusion    Cervicalgia     Cervicalgia    Deficiency of other specified B group vitamins 02/17/2020    B12 deficiency    Elevated erythrocyte sedimentation rate     Elevated erythrocyte sedimentation rate    Fever, unspecified     Fever of unknown origin    Generalized anxiety disorder 03/21/2013    Generalized anxiety disorder    Other conditions influencing health status     Methicillin Resistant Staphylococcus Aureus Infection    Other malaise     Malaise    Pain in right shoulder     Pain in joint of right shoulder    Personal history of diseases of the blood and blood-forming organs and certain disorders involving the immune mechanism     History of iron deficiency anemia    Personal history of other diseases of the circulatory system     History of hypertension    Personal history of other diseases of the musculoskeletal system and connective tissue     History of polymyalgia rheumatica    Personal history of other endocrine, nutritional and metabolic disease     History of hyperglycemia    Personal history of other specified conditions     History of weakness    Personal history of other specified conditions     History of shortness of  breath    Personal history of other specified conditions 2013    History of chest pain    Sedative, hypnotic or anxiolytic dependence, uncomplicated (CMS/[facility]) 2022    Benzodiazepine dependence, continuous    Vitamin B12 deficiency anemia due to intrinsic factor deficiency     Pernicious anemia    Vitamin D deficiency, unspecified 2020    Vitamin D deficiency       Past Surgical History:   Procedure Laterality Date    BACK SURGERY  2013    Back Surgery    HAND SURGERY  2013    Hand Surgery                                                                                                                                                          KNEE SURGERY  2013    Knee Surgery    NECK SURGERY  2017    Neck Surgery    OTHER SURGICAL HISTORY  03/10/2022    Hypertension       Family History   Problem Relation Name Age of Onset    Hypertension Mother      Diabetes Father      Hypertension Father      Heart attack Father         Social History     Tobacco Use    Smoking status: Former     Packs/day: 1.50     Years: 17.00     Additional pack years: 0.00     Total pack years: 25.50     Types: Cigarettes     Start date:      Quit date:      Years since quittin.1     Passive exposure: Past    Smokeless tobacco: Never   Substance Use Topics    Alcohol use: Not Currently       Current Outpatient Medications   Medication Sig Dispense Refill    amLODIPine (Norvasc) 2.5 mg tablet Take 1 tablet (2.5 mg) by mouth once daily.      cholecalciferol (Vitamin D-3) 25 MCG (1000 UT) capsule Take 1 capsule (25 mcg) by mouth.      diphenhydrAMINE (BENADryl) 25 mg capsule Take by mouth every 6 hours if needed for itching.      fluoride, sodium, (PreviDent) 1.1 % gel Apply to teeth. use once DAILY before bedtime      losartan (Cozaar) 50 mg tablet Take 1 tablet (50 mg) by mouth once daily.      multivitamin tablet Take 1 tablet by mouth once daily.      zinc sulfate (Zincate) 220 (50 Zn) MG  "capsule Take by mouth.       No current facility-administered medications for this visit.       PHYSICAL EXAM:  There were no vitals taken for this visit.   General appearance: No acute distress, cooperative.  Eyes: Nonicteric, no redness or proptosis  Ears, nose, mouth, and throat: Moist mucous membranes, tongue normal  Neck: Supple, no lymphadenopathy or thyromegaly  Lungs: Clear to auscultation bilaterally  CV: Regular rate and rhythm, no murmur; no pitting edema in the lower extremities  Abd: soft, non-tender; non-distended; normal active bowel sounds; no scars  Skin: No rashes or lesions; no liver stigmata  MSK: No deformities or joint edema/redness/tenderness  Neuro: Alert and oriented ×3, normal gait, non-focal no asterixis    Lab Results   Component Value Date    WBC 8.8 12/15/2023    HGB 13.1 (L) 12/15/2023    HCT 40.4 (L) 12/15/2023    MCV 88 12/15/2023     12/15/2023       Lab Results   Component Value Date    ALT 13 12/15/2023    AST 19 12/15/2023    ALKPHOS 74 12/15/2023    BILITOT 0.7 12/15/2023     No results found for: \"INR\", \"PROTIME\"    Lab Results   Component Value Date    IRON 40 10/17/2023    TIBC 291 10/17/2023    FERRITIN 184 10/17/2023          ASSESSMENT&Plan:  Abdominal pain: Resolved  Abnormal liver enzymes: Resolved  Pancreatic cysts: Most likely sidebranch IPMN without any alarming signs.  He does not have any alarming symptoms.  Normal liver enzymes and bilirubin  Will order MRI MRCP in 6 months to follow-up with pancreatic cyst in case of any change we will schedule him for endoscopic ultrasound and FNA  I discussed in details with the patient avoiding alcohol, smoking and using marijuana    Previously seen biliary duct dilation and filling defect was reviewed on MRI MRCP.  It is appears to be benign external compression but blood blister compressing common hepatic duct.  Patient has no symptoms and normal liver enzymes, bilirubin    History of chronic constipation  Taking " MiraLAX as needed  Advised about plenty of p.o. hydration and fibers    Screening colonoscopy: Due in 3 years  Will ask for previous colonoscopy  outpatient report    Consultation requested by Dr. Derrick He DO.   My final recommendations will be communicated back to the requesting physician by way of shared Medical record or letter to requesting physician via fax.          Signature: Katelyn Curry MD    Date: 2/2/2024  Time: 8:13 AM

## 2024-03-01 ENCOUNTER — TELEPHONE (OUTPATIENT)
Dept: PRIMARY CARE | Facility: CLINIC | Age: 74
End: 2024-03-01
Payer: MEDICARE

## 2024-03-01 NOTE — TELEPHONE ENCOUNTER
Fyi patient wanted Dr. He to know he is following up with his gastroenterologist for cyst on kidney and is having financial issues. He is not willing to schedule follow up appointment in our office at this time.

## 2024-03-07 NOTE — TELEPHONE ENCOUNTER
Pt called again and would like to know what his next step is regarding he pancreas. Please call and advise

## 2024-04-02 DIAGNOSIS — I10 HYPERTENSION, UNSPECIFIED TYPE: ICD-10-CM

## 2024-04-03 RX ORDER — AMLODIPINE BESYLATE 2.5 MG/1
2.5 TABLET ORAL NIGHTLY
Qty: 90 TABLET | Refills: 0 | Status: SHIPPED | OUTPATIENT
Start: 2024-04-03

## 2024-04-03 RX ORDER — LOSARTAN POTASSIUM 50 MG/1
50 TABLET ORAL DAILY
Qty: 90 TABLET | Refills: 0 | Status: SHIPPED | OUTPATIENT
Start: 2024-04-03

## 2024-04-16 ENCOUNTER — APPOINTMENT (OUTPATIENT)
Dept: PRIMARY CARE | Facility: CLINIC | Age: 74
End: 2024-04-16
Payer: MEDICARE

## 2024-04-23 ENCOUNTER — OFFICE VISIT (OUTPATIENT)
Dept: PRIMARY CARE | Facility: CLINIC | Age: 74
End: 2024-04-23
Payer: MEDICARE

## 2024-04-23 VITALS
DIASTOLIC BLOOD PRESSURE: 70 MMHG | SYSTOLIC BLOOD PRESSURE: 124 MMHG | WEIGHT: 136 LBS | TEMPERATURE: 97.6 F | HEIGHT: 68 IN | OXYGEN SATURATION: 99 % | HEART RATE: 76 BPM | BODY MASS INDEX: 20.61 KG/M2 | RESPIRATION RATE: 16 BRPM

## 2024-04-23 DIAGNOSIS — I10 HYPERTENSION, UNSPECIFIED TYPE: ICD-10-CM

## 2024-04-23 DIAGNOSIS — E53.8 B12 DEFICIENCY: ICD-10-CM

## 2024-04-23 DIAGNOSIS — G89.29 CHRONIC PAIN OF BOTH SHOULDERS: ICD-10-CM

## 2024-04-23 DIAGNOSIS — M25.511 CHRONIC PAIN OF BOTH SHOULDERS: ICD-10-CM

## 2024-04-23 DIAGNOSIS — G89.29 CHRONIC NECK PAIN: ICD-10-CM

## 2024-04-23 DIAGNOSIS — Z00.00 ROUTINE HEALTH MAINTENANCE: Primary | ICD-10-CM

## 2024-04-23 DIAGNOSIS — I73.9 PVD (PERIPHERAL VASCULAR DISEASE) (CMS-HCC): ICD-10-CM

## 2024-04-23 DIAGNOSIS — K40.90 LEFT INGUINAL HERNIA: ICD-10-CM

## 2024-04-23 DIAGNOSIS — R35.1 NOCTURIA: ICD-10-CM

## 2024-04-23 DIAGNOSIS — M25.512 CHRONIC PAIN OF BOTH SHOULDERS: ICD-10-CM

## 2024-04-23 DIAGNOSIS — F10.90 ALCOHOL USE DISORDER: ICD-10-CM

## 2024-04-23 DIAGNOSIS — K76.0 HEPATIC STEATOSIS: ICD-10-CM

## 2024-04-23 DIAGNOSIS — J30.2 SEASONAL ALLERGIES: ICD-10-CM

## 2024-04-23 DIAGNOSIS — D64.9 NORMOCYTIC ANEMIA: ICD-10-CM

## 2024-04-23 DIAGNOSIS — R13.10 DYSPHAGIA, UNSPECIFIED TYPE: ICD-10-CM

## 2024-04-23 DIAGNOSIS — K86.2 PANCREATIC CYST (HHS-HCC): ICD-10-CM

## 2024-04-23 DIAGNOSIS — G89.29 CHRONIC LOW BACK PAIN WITH SCIATICA, SCIATICA LATERALITY UNSPECIFIED, UNSPECIFIED BACK PAIN LATERALITY: ICD-10-CM

## 2024-04-23 DIAGNOSIS — M54.2 CHRONIC NECK PAIN: ICD-10-CM

## 2024-04-23 DIAGNOSIS — E78.5 HYPERLIPIDEMIA, UNSPECIFIED HYPERLIPIDEMIA TYPE: ICD-10-CM

## 2024-04-23 DIAGNOSIS — M54.40 CHRONIC LOW BACK PAIN WITH SCIATICA, SCIATICA LATERALITY UNSPECIFIED, UNSPECIFIED BACK PAIN LATERALITY: ICD-10-CM

## 2024-04-23 PROBLEM — D69.6 THROMBOCYTOPENIA, UNSPECIFIED (CMS-HCC): Status: ACTIVE | Noted: 2024-04-23

## 2024-04-23 PROCEDURE — 1036F TOBACCO NON-USER: CPT | Performed by: STUDENT IN AN ORGANIZED HEALTH CARE EDUCATION/TRAINING PROGRAM

## 2024-04-23 PROCEDURE — 1159F MED LIST DOCD IN RCRD: CPT | Performed by: STUDENT IN AN ORGANIZED HEALTH CARE EDUCATION/TRAINING PROGRAM

## 2024-04-23 PROCEDURE — 3078F DIAST BP <80 MM HG: CPT | Performed by: STUDENT IN AN ORGANIZED HEALTH CARE EDUCATION/TRAINING PROGRAM

## 2024-04-23 PROCEDURE — 3074F SYST BP LT 130 MM HG: CPT | Performed by: STUDENT IN AN ORGANIZED HEALTH CARE EDUCATION/TRAINING PROGRAM

## 2024-04-23 PROCEDURE — 99215 OFFICE O/P EST HI 40 MIN: CPT | Performed by: STUDENT IN AN ORGANIZED HEALTH CARE EDUCATION/TRAINING PROGRAM

## 2024-04-23 PROCEDURE — 1160F RVW MEDS BY RX/DR IN RCRD: CPT | Performed by: STUDENT IN AN ORGANIZED HEALTH CARE EDUCATION/TRAINING PROGRAM

## 2024-04-23 ASSESSMENT — ENCOUNTER SYMPTOMS
CHILLS: 0
LIGHT-HEADEDNESS: 0
FEVER: 0
DIAPHORESIS: 0
VOMITING: 0
NAUSEA: 0
SHORTNESS OF BREATH: 0
DIARRHEA: 0
DIZZINESS: 0

## 2024-04-23 NOTE — PROGRESS NOTES
"Subjective   Patient ID: Hal Gregorio is a 73 y.o. male who presents for Follow-up.  HPI    Here for routine follow up.    He has been abstinent from alcohol. Last alcohol use was in aure and had 1 bottle of aure johanne.     Seasonal allergies worse in last few weeks. Some benefit with OTC antihistamine.     Broke 5th digit in right hand in distant past.           Review of Systems   Constitutional:  Negative for chills, diaphoresis and fever.   Respiratory:  Negative for shortness of breath.    Cardiovascular:  Negative for chest pain.   Gastrointestinal:  Negative for diarrhea, nausea and vomiting.   Neurological:  Negative for dizziness and light-headedness.       /70   Pulse 76   Temp 36.4 °C (97.6 °F) (Temporal)   Resp 16   Ht 1.727 m (5' 8\")   Wt 61.7 kg (136 lb)   SpO2 99%   BMI 20.68 kg/m²     Objective   Physical Exam  Vitals reviewed.   Constitutional:       General: He is not in acute distress.     Appearance: Normal appearance.   HENT:      Head: Normocephalic.   Cardiovascular:      Rate and Rhythm: Normal rate and regular rhythm.   Pulmonary:      Effort: Pulmonary effort is normal. No respiratory distress.      Breath sounds: Normal breath sounds.   Abdominal:      General: Bowel sounds are normal. There is no distension.      Palpations: Abdomen is soft. There is no mass.      Tenderness: There is no abdominal tenderness. There is no guarding or rebound.   Musculoskeletal:         General: No deformity.   Skin:     Coloration: Skin is not jaundiced.   Neurological:      Mental Status: He is alert.         Assessment/Plan   Problem List Items Addressed This Visit       B12 deficiency    Chronic low back pain    Hyperlipidemia    PVD (peripheral vascular disease) (CMS-HCC)    Hypertension    Alcohol use disorder    Normocytic anemia    Relevant Orders    CBC    Left inguinal hernia    Chronic neck pain    Dysphagia    Hepatic steatosis    Nocturia    Seasonal allergies    " Pancreatic cyst ([facility]-HCC)    Chronic pain of both shoulders     Other Visit Diagnoses       Routine health maintenance    -  Primary            B12 deficiency  - Repeat B12 showed it was low  - On oral b12 1000mg/day, B12 improved at 485.      Chronic low back pain  -Still persistent.   - Discussed needs to see pain management and neurosurgery. Doesn't want to see pain management. Discussed can refer to another pain management doctor if interested and he declined.      Seasonal allergies  -On antihistamine  -Discussed to consider flonase and he defers    Pancreatic cyst  - Saw GI, will have MRI MRCP in 6 months from February 2024 per note from GI.   -Advised to reach out to GI for concerns/questions for this.     Bilateral shoulder pain  -Seeing orthopedics-hasn't seen in a few years.   -Right shoulder is back to normal and left shoulder still causing some issues.   -Let us know if would like to see orthopedics again.      Hepatic steatosis  -Noted on 9/22 CT abdomen/pelvis  -Referred to hepatology and ordered u/s liver. Possibly from alcohol.      Hyperlipidemia  - Continue follow-up with cardiology, management per cardiology  -Repeat lipid panel showed LDL 70  -Reports he is on omega 3.      Peripheral vascular disease  - Continue follow-up with cardiology     Hypertension  - Controlled on losartan and amlodipine     Alcohol use disorder  - Recommended to follow-up with alcoholic Anonymous previously, congratulated efforts on quitting alcohol     Normocytic anemia  - Improved on labs and discussed to obtain repeat now     Elevated liver enzymes  - Repeat CMP normal     Inguinal hernia  - Status post bilateral inguinal hernia repair with surgery  -F/u general surgery for concerns with this.      Right lower lobe lung consolidation  - Had x-ray and CT chest which showed it resolved/was artifact.     Dysphagia  -Referred to ENT. Doesn't seem to be GI etiology.  Had swallow study which showed no evidence of mass or  stricture on speech therapy advised follow-up.  -Not very noticeable at this point to him.     Chronic neck pain  - Advise follow-up with neurosurgery previously.     Nocturia  - PSA normal.   -Doesn't notice it when he is not thinking about it. Notices it more at night.   -Avoid drinking fluids late at night, can consider flomax or urology referral if still problematic. He isn't interested in flomax as he feels it is mild.  Will let us know if persistent.      Advised f/u 3-4 months but he will follow up in 6 months at his decision. Discussed f/u sooner if needed.     Health Maintenance  -Prostate Cancer Screening: - 10/2023  -Vaccinations: Recommend pneumonia, Shingrix, UTD TDAP, rec bivalent covid booster.  -Lung Cancer Screening: Aged out  -AAA Screening: Current with Dr. Lainez  -Colonoscopy: Colonoscopy was done September 2021, due in 2026    MWV in 3 months  F/u sooner PRN

## 2024-05-22 ENCOUNTER — PATIENT OUTREACH (OUTPATIENT)
Dept: PRIMARY CARE | Facility: CLINIC | Age: 74
End: 2024-05-22
Payer: MEDICARE

## 2024-07-02 DIAGNOSIS — I10 HYPERTENSION, UNSPECIFIED TYPE: ICD-10-CM

## 2024-07-03 RX ORDER — LOSARTAN POTASSIUM 50 MG/1
50 TABLET ORAL DAILY
Qty: 90 TABLET | Refills: 0 | Status: SHIPPED | OUTPATIENT
Start: 2024-07-03

## 2024-07-03 RX ORDER — AMLODIPINE BESYLATE 2.5 MG/1
2.5 TABLET ORAL NIGHTLY
Qty: 90 TABLET | Refills: 0 | Status: SHIPPED | OUTPATIENT
Start: 2024-07-03

## 2024-07-08 DIAGNOSIS — I10 HYPERTENSION, UNSPECIFIED TYPE: ICD-10-CM

## 2024-07-10 RX ORDER — AMLODIPINE BESYLATE 2.5 MG/1
2.5 TABLET ORAL NIGHTLY
Qty: 90 TABLET | Refills: 0 | Status: SHIPPED | OUTPATIENT
Start: 2024-07-10

## 2024-07-10 RX ORDER — LOSARTAN POTASSIUM 50 MG/1
50 TABLET ORAL DAILY
Qty: 90 TABLET | Refills: 0 | Status: SHIPPED | OUTPATIENT
Start: 2024-07-10

## 2024-10-02 ENCOUNTER — APPOINTMENT (OUTPATIENT)
Dept: PRIMARY CARE | Facility: CLINIC | Age: 74
End: 2024-10-02
Payer: MEDICARE

## 2024-10-03 ENCOUNTER — LAB (OUTPATIENT)
Dept: LAB | Facility: LAB | Age: 74
End: 2024-10-03
Payer: MEDICARE

## 2024-10-03 ENCOUNTER — APPOINTMENT (OUTPATIENT)
Dept: PRIMARY CARE | Facility: CLINIC | Age: 74
End: 2024-10-03
Payer: MEDICARE

## 2024-10-03 VITALS
OXYGEN SATURATION: 99 % | BODY MASS INDEX: 20 KG/M2 | HEIGHT: 68 IN | SYSTOLIC BLOOD PRESSURE: 130 MMHG | TEMPERATURE: 98.4 F | RESPIRATION RATE: 16 BRPM | DIASTOLIC BLOOD PRESSURE: 80 MMHG | WEIGHT: 132 LBS | HEART RATE: 81 BPM

## 2024-10-03 DIAGNOSIS — R35.1 NOCTURIA: ICD-10-CM

## 2024-10-03 DIAGNOSIS — I10 HYPERTENSION, UNSPECIFIED TYPE: ICD-10-CM

## 2024-10-03 DIAGNOSIS — M25.512 CHRONIC PAIN OF BOTH SHOULDERS: ICD-10-CM

## 2024-10-03 DIAGNOSIS — M25.511 CHRONIC PAIN OF BOTH SHOULDERS: ICD-10-CM

## 2024-10-03 DIAGNOSIS — I73.9 PVD (PERIPHERAL VASCULAR DISEASE) (CMS-HCC): ICD-10-CM

## 2024-10-03 DIAGNOSIS — M54.2 CHRONIC NECK PAIN: ICD-10-CM

## 2024-10-03 DIAGNOSIS — E78.5 HYPERLIPIDEMIA, UNSPECIFIED HYPERLIPIDEMIA TYPE: ICD-10-CM

## 2024-10-03 DIAGNOSIS — R73.9 HYPERGLYCEMIA: ICD-10-CM

## 2024-10-03 DIAGNOSIS — G89.29 CHRONIC NECK PAIN: ICD-10-CM

## 2024-10-03 DIAGNOSIS — D69.6 THROMBOCYTOPENIA, UNSPECIFIED (CMS-HCC): ICD-10-CM

## 2024-10-03 DIAGNOSIS — G89.29 CHRONIC PAIN OF BOTH SHOULDERS: ICD-10-CM

## 2024-10-03 DIAGNOSIS — G89.29 CHRONIC LOW BACK PAIN WITH SCIATICA, SCIATICA LATERALITY UNSPECIFIED, UNSPECIFIED BACK PAIN LATERALITY: ICD-10-CM

## 2024-10-03 DIAGNOSIS — F10.939 ALCOHOL USE, UNSPECIFIED WITH WITHDRAWAL, UNSPECIFIED (MULTI): ICD-10-CM

## 2024-10-03 DIAGNOSIS — Z12.5 ENCOUNTER FOR PROSTATE CANCER SCREENING: ICD-10-CM

## 2024-10-03 DIAGNOSIS — Z00.00 ROUTINE HEALTH MAINTENANCE: ICD-10-CM

## 2024-10-03 DIAGNOSIS — E53.8 B12 DEFICIENCY: ICD-10-CM

## 2024-10-03 DIAGNOSIS — K86.2 PANCREATIC CYST (HHS-HCC): ICD-10-CM

## 2024-10-03 DIAGNOSIS — M54.40 CHRONIC LOW BACK PAIN WITH SCIATICA, SCIATICA LATERALITY UNSPECIFIED, UNSPECIFIED BACK PAIN LATERALITY: ICD-10-CM

## 2024-10-03 DIAGNOSIS — Z23 ENCOUNTER FOR IMMUNIZATION: ICD-10-CM

## 2024-10-03 DIAGNOSIS — M04.1 FAMILIAL MEDITERRANEAN FEVER (MULTI): ICD-10-CM

## 2024-10-03 DIAGNOSIS — Z00.00 ROUTINE HEALTH MAINTENANCE: Primary | ICD-10-CM

## 2024-10-03 DIAGNOSIS — J30.2 SEASONAL ALLERGIES: ICD-10-CM

## 2024-10-03 LAB
ERYTHROCYTE [DISTWIDTH] IN BLOOD BY AUTOMATED COUNT: 13.4 % (ref 11.5–14.5)
HCT VFR BLD AUTO: 44.7 % (ref 41–52)
HGB BLD-MCNC: 15 G/DL (ref 13.5–17.5)
MCH RBC QN AUTO: 29.2 PG (ref 26–34)
MCHC RBC AUTO-ENTMCNC: 33.6 G/DL (ref 32–36)
MCV RBC AUTO: 87 FL (ref 80–100)
NRBC BLD-RTO: 0 /100 WBCS (ref 0–0)
PLATELET # BLD AUTO: 221 X10*3/UL (ref 150–450)
RBC # BLD AUTO: 5.13 X10*6/UL (ref 4.5–5.9)
WBC # BLD AUTO: 7.9 X10*3/UL (ref 4.4–11.3)

## 2024-10-03 PROCEDURE — 99214 OFFICE O/P EST MOD 30 MIN: CPT | Performed by: STUDENT IN AN ORGANIZED HEALTH CARE EDUCATION/TRAINING PROGRAM

## 2024-10-03 PROCEDURE — 36415 COLL VENOUS BLD VENIPUNCTURE: CPT

## 2024-10-03 PROCEDURE — 93000 ELECTROCARDIOGRAM COMPLETE: CPT | Performed by: STUDENT IN AN ORGANIZED HEALTH CARE EDUCATION/TRAINING PROGRAM

## 2024-10-03 PROCEDURE — 3075F SYST BP GE 130 - 139MM HG: CPT | Performed by: STUDENT IN AN ORGANIZED HEALTH CARE EDUCATION/TRAINING PROGRAM

## 2024-10-03 PROCEDURE — 1123F ACP DISCUSS/DSCN MKR DOCD: CPT | Performed by: STUDENT IN AN ORGANIZED HEALTH CARE EDUCATION/TRAINING PROGRAM

## 2024-10-03 PROCEDURE — 3079F DIAST BP 80-89 MM HG: CPT | Performed by: STUDENT IN AN ORGANIZED HEALTH CARE EDUCATION/TRAINING PROGRAM

## 2024-10-03 PROCEDURE — G0008 ADMIN INFLUENZA VIRUS VAC: HCPCS | Performed by: STUDENT IN AN ORGANIZED HEALTH CARE EDUCATION/TRAINING PROGRAM

## 2024-10-03 PROCEDURE — 90662 IIV NO PRSV INCREASED AG IM: CPT | Performed by: STUDENT IN AN ORGANIZED HEALTH CARE EDUCATION/TRAINING PROGRAM

## 2024-10-03 PROCEDURE — 1170F FXNL STATUS ASSESSED: CPT | Performed by: STUDENT IN AN ORGANIZED HEALTH CARE EDUCATION/TRAINING PROGRAM

## 2024-10-03 PROCEDURE — 1159F MED LIST DOCD IN RCRD: CPT | Performed by: STUDENT IN AN ORGANIZED HEALTH CARE EDUCATION/TRAINING PROGRAM

## 2024-10-03 PROCEDURE — 1160F RVW MEDS BY RX/DR IN RCRD: CPT | Performed by: STUDENT IN AN ORGANIZED HEALTH CARE EDUCATION/TRAINING PROGRAM

## 2024-10-03 PROCEDURE — 1036F TOBACCO NON-USER: CPT | Performed by: STUDENT IN AN ORGANIZED HEALTH CARE EDUCATION/TRAINING PROGRAM

## 2024-10-03 PROCEDURE — 3008F BODY MASS INDEX DOCD: CPT | Performed by: STUDENT IN AN ORGANIZED HEALTH CARE EDUCATION/TRAINING PROGRAM

## 2024-10-03 PROCEDURE — 1158F ADVNC CARE PLAN TLK DOCD: CPT | Performed by: STUDENT IN AN ORGANIZED HEALTH CARE EDUCATION/TRAINING PROGRAM

## 2024-10-03 ASSESSMENT — ENCOUNTER SYMPTOMS
VOMITING: 0
DIZZINESS: 0
DIARRHEA: 0
DIAPHORESIS: 0
NAUSEA: 0
LOSS OF SENSATION IN FEET: 1
DEPRESSION: 0
MYALGIAS: 0
UNEXPECTED WEIGHT CHANGE: 0
LIGHT-HEADEDNESS: 0
OCCASIONAL FEELINGS OF UNSTEADINESS: 0
SHORTNESS OF BREATH: 0
CHILLS: 0
FEVER: 0
DYSURIA: 0

## 2024-10-03 ASSESSMENT — ACTIVITIES OF DAILY LIVING (ADL)
BATHING: INDEPENDENT
DRESSING: INDEPENDENT
MANAGING_FINANCES: INDEPENDENT
DOING_HOUSEWORK: INDEPENDENT
TAKING_MEDICATION: INDEPENDENT
GROCERY_SHOPPING: INDEPENDENT

## 2024-10-03 NOTE — ASSESSMENT & PLAN NOTE
Orders:    Prostate Spec.Ag,Screen; Future    Follow Up In [facility] - PCP - Medicare Annual; Future    Follow Up In [facility] - PCP - [facility]; Future

## 2024-10-03 NOTE — ASSESSMENT & PLAN NOTE
Orders:    CBC; Future    Follow Up In [facility] - PCP - Medicare Annual; Future    Follow Up In [facility] - PCP - [facility]; Future

## 2024-10-03 NOTE — ASSESSMENT & PLAN NOTE
Orders:    CBC; Future    Lipid Panel; Future    Comprehensive Metabolic Panel; Future    TSH with reflex to Free T4 if abnormal; Future    Follow Up In [facility] - PCP - Medicare Annual; Future    Follow Up In [facility] - PCP - [facility]; Future

## 2024-10-03 NOTE — ASSESSMENT & PLAN NOTE
Orders:    Flu vaccine, trivalent, preservative free, HIGH-DOSE, age 65y+ (Fluzone)    Follow Up In [facility] - PCP - Medicare Annual; Future    Follow Up In [facility] - PCP - Established; Future

## 2024-10-03 NOTE — ASSESSMENT & PLAN NOTE
Orders:    Vitamin B12; Future    Follow Up In [facility] - PCP - Medicare Annual; Future    Follow Up In [facility] - PCP - \[facility] [place] [facility]\""; Future

## 2024-10-03 NOTE — ASSESSMENT & PLAN NOTE
Orders:    CBC; Future    Follow Up In [facility] - PCP - Medicare Annual; Future    Follow Up In [facility] - PCP - [facility]; Future

## 2024-10-03 NOTE — PROGRESS NOTES
"Subjective   Reason for Visit: Hal Gregorio is an 73 y.o. male here for a Medicare Wellness visit.          Reviewed all medications by prescribing practitioner or clinical pharmacist (such as prescriptions, OTCs, herbal therapies and supplements) and documented in the medical record.    HPI    If in position for long period of time has loss feeling in left leg and reports was worked up with EMG and was negative. Reports had PVR and was negative. Reports stable symptoms for years.  Defers workup at this time.    Patient Care Team:  Derrick He DO as PCP - General (Internal Medicine)  Derrick He DO as PCP [facility]ributed [facility]     Review of Systems   Constitutional:  Negative for chills, diaphoresis, fever and unexpected weight change.   HENT:  Negative for hearing loss.    Eyes:  Negative for visual disturbance.   Respiratory:  Negative for shortness of breath.    Cardiovascular:  Negative for chest pain.   Gastrointestinal:  Negative for diarrhea, nausea and vomiting.   Endocrine: Negative for cold intolerance and heat intolerance.   Genitourinary:  Negative for dysuria, scrotal swelling and testicular pain.   Musculoskeletal:  Negative for myalgias.   Skin:  Negative for rash.   Neurological:  Negative for dizziness, syncope and light-headedness.       Objective   Vitals:  /80   Pulse 81   Temp 36.9 °C (98.4 °F) (Temporal)   Resp 16   Ht 1.727 m (5' 8\")   Wt 59.9 kg (132 lb)   SpO2 99%   BMI 20.07 kg/m²       Physical Exam  Vitals reviewed.   Constitutional:       General: He is not in acute distress.     Appearance: Normal appearance.   HENT:      Head: Normocephalic.      Right Ear: Tympanic membrane, ear canal and external ear normal. There is impacted cerumen (Partial impacted cerumen).      Left Ear: Tympanic membrane, ear canal and external ear normal. There is impacted cerumen (Partial impacted cerumen).      Mouth/Throat:      Mouth: Mucous membranes are moist.      Pharynx: " Oropharynx is clear. No oropharyngeal exudate or posterior oropharyngeal erythema.   Cardiovascular:      Rate and Rhythm: Normal rate and regular rhythm.   Pulmonary:      Effort: Pulmonary effort is normal. No respiratory distress.      Breath sounds: Normal breath sounds.   Abdominal:      General: Bowel sounds are normal. There is no distension.      Palpations: Abdomen is soft. There is no mass.      Tenderness: There is no abdominal tenderness. There is no guarding or rebound.   Musculoskeletal:         General: No deformity.      Cervical back: Neck supple. No tenderness.   Lymphadenopathy:      Cervical: No cervical adenopathy.   Skin:     Coloration: Skin is not jaundiced.   Neurological:      Mental Status: He is alert.         Assessment & Plan  Routine health maintenance    Orders:    CBC; Future    Lipid Panel; Future    Comprehensive Metabolic Panel; Future    TSH with reflex to Free T4 if abnormal; Future    Follow Up In [facility] - PCP - Medicare Annual; Future    Follow Up In [facility]; Future    Encounter for prostate cancer screening    Orders:    Prostate Spec.Ag,Screen; Future    Follow Up In [facility] - PCP - Medicare Annual; Future    Follow Up In [facility]; Future    B12 deficiency    Orders:    Vitamin B12; Future    Follow Up In [facility] - PCP - Medicare Annual; Future    Follow Up In [facility]; Future    Hypertension, unspecified type    Orders:    CBC; Future    Follow Up In [facility] - PCP - Medicare Annual; Future    Follow Up In [facility]; Future    Hyperlipidemia, unspecified hyperlipidemia type    Orders:    CBC; Future    Follow Up In [facility] - PCP - Medicare Annual; Future    Follow Up In [facility]; Future    Encounter for immunization    Orders:    Flu vaccine,  trivalent, preservative free, HIGH-DOSE, age 65y+ (Fluzone)    Follow Up In [facility] - PCP - Medicare Annual; Future    Follow Up In [facility]"; Future    Familial Mediterranean fever (Multi)    Orders:    Follow Up In [facility] - PCP - Medicare Annual; Future    Follow Up In [facility] - [facility]"; Future    Alcohol use, unspecified with withdrawal, unspecified (Multi)    Orders:    Follow Up In [facility] - PCP - Medicare Annual; Future    Follow Up In [facility] - [facility]"; Future    Thrombocytopenia, unspecified (CMS-[facility])    Orders:    Follow Up In [facility] - PCP - Medicare Annual; Future    Follow Up In [facility]; Future    Chronic low back pain with sciatica, sciatica laterality unspecified, unspecified back pain laterality    Orders:    Follow Up In [facility] - PCP - Medicare Annual; Future    Follow Up In [facility]; Future    Seasonal allergies    Orders:    Follow Up In [facility] - PCP - Medicare Annual; Future    Follow Up In [facility]; Future    Pancreatic cyst ([facility])    Orders:    Follow Up In [facility] - PCP - Medicare Annual; Future    Follow Up In [facility]; Future    Chronic pain of both shoulders    Orders:    Follow Up In [facility] - PCP - Medicare Annual; Future    Follow Up In [facility]; Future    PVD (peripheral vascular disease) (CMS-HCC)    Orders:    Follow Up In [facility] - PCP - Medicare Annual; Future    Follow Up In [facility]; Future    Chronic neck pain    Orders:    Follow Up In [facility] - PCP - Medicare Annual; Future    Follow Up In [facility];  Future    Nocturia    Orders:    Follow Up In [facility] - PCP - Medicare Annual; Future    Follow Up In [facility] - PCP - Established; Future               B12 deficiency  - Repeat B12 showed it was low  - On oral b12 1000mg/day, B12 improved at 485 10/23, recheck.   - Still taking B12     Chronic low back pain  -Still persistent.   - Discussed needs to see pain management and neurosurgery. Doesn't want to see pain management. Discussed can refer to another pain management doctor if interested and he declined.      Seasonal allergies  -On antihistamine  -Discussed to consider flonase and he defers     Pancreatic cyst  - Saw GI, will have MRI MRCP in 6 months from February 2024 per note from GI.  Ordered but not done. He will schedule  -Advised to reach out to GI for concerns/questions for this.     Bilateral shoulder pain  -Seeing orthopedics-hasn't seen in a few years.   -Right shoulder is back to normal and left shoulder still causing some issues.   -Let us know if would like to see orthopedics again. Better with exercise     Hepatic steatosis  -Noted on 9/22 CT abdomen/pelvis  -Referred to hepatology and ordered u/s liver. Possibly from alcohol.      Hyperlipidemia  - Continue follow-up with cardiology, management per cardiology  -Repeat lipid panel showed LDL 70  -Reports he is on omega 3.      Peripheral vascular disease  - Continue follow-up with cardiology     Hypertension  -Relatively controlled on losartan and amlodipine  -Monitor at home, let us know if consistently over 130/90     Alcohol use disorder  - Recommended to follow-up with alcoholic Anonymous previously, congratulated efforts on quitting alcohol  - 4 weeks ago for christening he had a beer. No hard spirits since fall on his head. Recommend to abstain from alcohol.      Normocytic anemia  - Improved on labs and discussed to obtain repeat now     Elevated liver enzymes-Repeat CMP normal     History of inguinal hernia  -  Status post bilateral inguinal hernia repair with surgery  -Feels twinge from time to time. No bulges he can feel.   -F/u general surgery for concerns with this. Er for bulge stuck out or painful.      Dysphagia  -Referred to ENT. Doesn't seem to be GI etiology.  Had swallow study which showed no evidence of mass or stricture on speech therapy advised follow-up.  -Not very noticeable at this point to him. Thinks it was related to his teeth being removed.      Chronic neck pain  - Advise follow-up with neurosurgery previously. Defers referral.      Nocturia  - PSA normal last year, recheck   -Doesn't notice it when he is not thinking about it. Notices it more at night.   -Avoid drinking fluids late at night, can consider flomax or urology referral if still problematic. He isn't interested in flomax as he feels it is mild.  Will let us know if persistent.     History of familial Mediterranean fever-stable     Health Maintenance  -Prostate Cancer Screening: Negative 10/2023, reordered   -Vaccinations: UTD prevnar 20, Shingrix, UTD TDAP, rec covid booster 90 days after having covid. Interested in flu vaccine, give today.  -Lung Cancer Screening: Aged out  -AAA Screening: Follows with Dr. Lainez  -Colonoscopy: Colonoscopy was done September 2021, due in 2026  -ACP: Wants children to make decisions for him, doesn't have hCPOA, provided info.    CPE completed.  Advised to keep a heart healthy, low fat  diet with fruits and veggies like Mediterranean diet.  Advised on the importance of exercise and maintaining 150 minutes of exercise per week (30 minutes per day 5 days a week).  Advised on regular eye and dental visits.  Discussed age appropriate cancer screening, immunizations and recommendations given.  Discussed avoiding illicit drugs and tobacco. Advised on appropriate use of alcohol.  Advised to wear seat belt.    F/u 6 months  Fasting labs ordered  MWV 1 year

## 2024-10-03 NOTE — ASSESSMENT & PLAN NOTE
Orders:    Follow Up In [facility] - PCP - Medicare Annual; Future    Follow Up In [facility] - PCP - Established; Future

## 2024-10-04 DIAGNOSIS — R73.9 HYPERGLYCEMIA: Primary | ICD-10-CM

## 2024-10-04 LAB
ALBUMIN SERPL BCP-MCNC: 4.7 G/DL (ref 3.4–5)
ALP SERPL-CCNC: 83 U/L (ref 33–136)
ALT SERPL W P-5'-P-CCNC: 15 U/L (ref 10–52)
ANION GAP SERPL CALC-SCNC: 15 MMOL/L (ref 10–20)
AST SERPL W P-5'-P-CCNC: 23 U/L (ref 9–39)
BILIRUB SERPL-MCNC: 1 MG/DL (ref 0–1.2)
BUN SERPL-MCNC: 12 MG/DL (ref 6–23)
CALCIUM SERPL-MCNC: 9.9 MG/DL (ref 8.6–10.6)
CHLORIDE SERPL-SCNC: 99 MMOL/L (ref 98–107)
CHOLEST SERPL-MCNC: 180 MG/DL (ref 0–199)
CHOLESTEROL/HDL RATIO: 2.4
CO2 SERPL-SCNC: 31 MMOL/L (ref 21–32)
CREAT SERPL-MCNC: 0.9 MG/DL (ref 0.5–1.3)
EGFRCR SERPLBLD CKD-EPI 2021: 90 ML/MIN/1.73M*2
EST. AVERAGE GLUCOSE BLD GHB EST-MCNC: 126 MG/DL
GLUCOSE SERPL-MCNC: 105 MG/DL (ref 74–99)
HBA1C MFR BLD: 6 %
HDLC SERPL-MCNC: 75.7 MG/DL
LDLC SERPL CALC-MCNC: 91 MG/DL
NON HDL CHOLESTEROL: 104 MG/DL (ref 0–149)
POTASSIUM SERPL-SCNC: 4 MMOL/L (ref 3.5–5.3)
PROT SERPL-MCNC: 8.1 G/DL (ref 6.4–8.2)
PSA SERPL-MCNC: 3.68 NG/ML
SODIUM SERPL-SCNC: 141 MMOL/L (ref 136–145)
TRIGL SERPL-MCNC: 66 MG/DL (ref 0–149)
TSH SERPL-ACNC: 1.23 MIU/L (ref 0.44–3.98)
VIT B12 SERPL-MCNC: 965 PG/ML (ref 211–911)
VLDL: 13 MG/DL (ref 0–40)

## 2024-12-10 ENCOUNTER — HOSPITAL ENCOUNTER (OUTPATIENT)
Dept: RADIOLOGY | Facility: CLINIC | Age: 74
Discharge: HOME | End: 2024-12-10
Payer: MEDICARE

## 2024-12-10 DIAGNOSIS — K83.8 COMMON BILE DUCT DILATION: ICD-10-CM

## 2024-12-10 DIAGNOSIS — R74.8 LIVER ENZYME ELEVATION: ICD-10-CM

## 2024-12-10 DIAGNOSIS — K86.2 PANCREATIC CYST (HHS-HCC): ICD-10-CM

## 2024-12-10 DIAGNOSIS — K59.09 OTHER CONSTIPATION: ICD-10-CM

## 2024-12-10 PROCEDURE — 2550000001 HC RX 255 CONTRASTS: Performed by: STUDENT IN AN ORGANIZED HEALTH CARE EDUCATION/TRAINING PROGRAM

## 2024-12-10 PROCEDURE — A9575 INJ GADOTERATE MEGLUMI 0.1ML: HCPCS | Performed by: STUDENT IN AN ORGANIZED HEALTH CARE EDUCATION/TRAINING PROGRAM

## 2024-12-10 PROCEDURE — 76376 3D RENDER W/INTRP POSTPROCES: CPT | Performed by: RADIOLOGY

## 2024-12-10 PROCEDURE — 74183 MRI ABD W/O CNTR FLWD CNTR: CPT | Performed by: RADIOLOGY

## 2024-12-10 PROCEDURE — 74183 MRI ABD W/O CNTR FLWD CNTR: CPT

## 2024-12-10 RX ORDER — GADOTERATE MEGLUMINE 376.9 MG/ML
12 INJECTION INTRAVENOUS
Status: COMPLETED | OUTPATIENT
Start: 2024-12-10 | End: 2024-12-10

## 2024-12-12 ENCOUNTER — TELEPHONE (OUTPATIENT)
Dept: PRIMARY CARE | Facility: CLINIC | Age: 74
End: 2024-12-12
Payer: MEDICARE

## 2025-01-06 DIAGNOSIS — K86.2 PANCREATIC CYST (HHS-HCC): Primary | ICD-10-CM

## 2025-01-23 DIAGNOSIS — I10 HYPERTENSION, UNSPECIFIED TYPE: ICD-10-CM

## 2025-01-24 RX ORDER — AMLODIPINE BESYLATE 2.5 MG/1
2.5 TABLET ORAL NIGHTLY
Qty: 90 TABLET | Refills: 3 | Status: SHIPPED | OUTPATIENT
Start: 2025-01-24 | End: 2026-01-24

## 2025-01-24 RX ORDER — LOSARTAN POTASSIUM 50 MG/1
50 TABLET ORAL DAILY
Qty: 90 TABLET | Refills: 3 | Status: SHIPPED | OUTPATIENT
Start: 2025-01-24 | End: 2026-01-24

## 2025-02-06 ENCOUNTER — APPOINTMENT (OUTPATIENT)
Dept: CARDIOLOGY | Facility: CLINIC | Age: 75
End: 2025-02-06
Payer: MEDICARE

## 2025-02-06 VITALS
WEIGHT: 134 LBS | SYSTOLIC BLOOD PRESSURE: 136 MMHG | HEART RATE: 84 BPM | DIASTOLIC BLOOD PRESSURE: 78 MMHG | BODY MASS INDEX: 20.31 KG/M2 | HEIGHT: 68 IN

## 2025-02-06 DIAGNOSIS — I10 HYPERTENSION, UNSPECIFIED TYPE: Primary | ICD-10-CM

## 2025-02-06 PROCEDURE — 1036F TOBACCO NON-USER: CPT | Performed by: INTERNAL MEDICINE

## 2025-02-06 PROCEDURE — 1160F RVW MEDS BY RX/DR IN RCRD: CPT | Performed by: INTERNAL MEDICINE

## 2025-02-06 PROCEDURE — 99214 OFFICE O/P EST MOD 30 MIN: CPT | Performed by: INTERNAL MEDICINE

## 2025-02-06 PROCEDURE — 1126F AMNT PAIN NOTED NONE PRSNT: CPT | Performed by: INTERNAL MEDICINE

## 2025-02-06 PROCEDURE — 3075F SYST BP GE 130 - 139MM HG: CPT | Performed by: INTERNAL MEDICINE

## 2025-02-06 PROCEDURE — 3078F DIAST BP <80 MM HG: CPT | Performed by: INTERNAL MEDICINE

## 2025-02-06 PROCEDURE — 1159F MED LIST DOCD IN RCRD: CPT | Performed by: INTERNAL MEDICINE

## 2025-02-06 ASSESSMENT — PAIN SCALES - GENERAL: PAINLEVEL_OUTOF10: 0-NO PAIN

## 2025-02-06 NOTE — PROGRESS NOTES
"Chief Complaint:   No chief complaint on file.     History Of Present Illness:    Hal Gregorio is a 74 y.o. male with a history of hypertension and dyslipidemia here for follow-up.     Underwent surgery on his eyes recently.  He is using some steroid drops.  Blood pressure seems to be a little bit higher during the time he is been using steroid drops.  Otherwise his blood pressure has been well-controlled.  Denies any exertional chest pain or shortness of breath.  Denies any palpitations or claudication.     PVR 3/5/2020: No evidence of occlusive disease.     Treadmill stress test 12/27/12. Exercise 7 minutes and 30 seconds on standard Jasiel protocol reaching 90% of the atrophic the maximum heart rate. No ECG evidence of ischemia.     Echocardiogram 12/27/12 demonstrating normal LV size and function, EF 60-65%. Mildly dilated RV with normal function. Mild MR and TR.     Allergies:  Nsaids (non-steroidal anti-inflammatory drug), Duloxetine, Indomethacin, Irbesartan, Lisinopril, Metaxalone, Metoprolol, and Amlodipine    Outpatient Medications:  Current Outpatient Medications   Medication Instructions    amLODIPine (NORVASC) 2.5 mg, oral, Nightly    cholecalciferol (VITAMIN D-3) 25 mcg    losartan (COZAAR) 50 mg, oral, Daily    multivitamin tablet 1 tablet, Daily       Last Recorded Vitals:  Visit Vitals  /78 (BP Location: Left arm, Patient Position: Sitting, BP Cuff Size: Adult)   Pulse 84   Ht 1.727 m (5' 8\")   Wt 60.8 kg (134 lb)   BMI 20.37 kg/m²   Smoking Status Former   BSA 1.71 m²      LASTWT(3):   Wt Readings from Last 3 Encounters:   02/06/25 60.8 kg (134 lb)   10/03/24 59.9 kg (132 lb)   04/23/24 61.7 kg (136 lb)       Physical Exam:  In general: alert and in no acute distress.   HEENT: Carotid upstrokes normal with no bruits. JVP is normal.   Pulmonary: Clear to auscultation bilaterally.  Cardiovascular: S1, S2, regular. No appreciable murmurs, rubs or gallops.   Lower extremities: Warm. 2+ distal " pulses. No edema.     Last Labs:  CBC -  Recent Labs     10/03/24  1205 12/15/23  1623 10/17/23  1008   WBC 7.9 8.8 6.4   HGB 15.0 13.1* 12.7*   HCT 44.7 40.4* 39.0*    208 202   MCV 87 88 88       CMP -  Recent Labs     10/03/24  1205 12/15/23  1623 08/14/23  1157 03/22/23  0545 03/19/23  0610 03/18/23  0318 03/17/23  0355    139 139   < > 132* 131* 134*   K 4.0 3.7 3.8   < > 3.9 3.8 3.3*   CL 99 100 101   < > 95* 96* 95*   CO2 31 34* 32   < > 29 28 30   ANIONGAP 15 9* 10   < > 12 11 12   BUN 12 15 14   < > 15 14 12   CREATININE 0.90 0.91 0.99   < > 0.82 0.67 0.71   EGFR 90 88  --   --   --   --   --    MG  --   --   --   --  2.10 1.74 2.21    < > = values in this interval not displayed.     Recent Labs     10/03/24  1205 12/15/23  1623 07/10/23  1351   ALBUMIN 4.7 4.6 4.3   ALKPHOS 83 74 69   ALT 15 13 11   AST 23 19 17   BILITOT 1.0 0.7 0.4       LIPID PANEL -   Recent Labs     10/03/24  1205 10/17/23  1008 03/10/22  1133 03/01/21  1056 02/12/20  1109   CHOL 180 134 190 173 208*   LDLCALC 91 70  --   --   --    LDLF  --   --  102* 100* 104*   HDL 75.7 50.2 75.0 55.0 87.8   TRIG 66 67 65 88 81       Recent Labs     10/03/24  1205   HGBA1C 6.0*           Assessment/Plan   1) hypertension: Blood pressure relatively well-controlled.  May be slightly higher due to his steroid drops.  Would continue to observe and if remain elevated after he is done with steroids he is instructed to call our office.  Otherwise recommend that he stay on amlodipine 2.5 mg daily for now along with his losartan 50 mg daily.    2) dyslipidemia call most recent LDL 91.  Based on most current guideline recommendations there is no role for statin therapy.    3) follow-up: 1 to 2 years or sooner if needed.      Lakhwinder Lainez MD

## 2025-06-10 ENCOUNTER — APPOINTMENT (OUTPATIENT)
Dept: RADIOLOGY | Facility: CLINIC | Age: 75
End: 2025-06-10
Payer: MEDICARE

## 2025-06-17 ENCOUNTER — HOSPITAL ENCOUNTER (OUTPATIENT)
Dept: RADIOLOGY | Facility: CLINIC | Age: 75
Discharge: HOME | End: 2025-06-17
Payer: MEDICARE

## 2025-06-17 DIAGNOSIS — K86.2 PANCREATIC CYST (HHS-HCC): ICD-10-CM

## 2025-06-17 PROCEDURE — 2550000001 HC RX 255 CONTRASTS: Performed by: INTERNAL MEDICINE

## 2025-06-17 PROCEDURE — 76376 3D RENDER W/INTRP POSTPROCES: CPT | Performed by: RADIOLOGY

## 2025-06-17 PROCEDURE — 74183 MRI ABD W/O CNTR FLWD CNTR: CPT

## 2025-06-17 PROCEDURE — A9575 INJ GADOTERATE MEGLUMI 0.1ML: HCPCS | Performed by: INTERNAL MEDICINE

## 2025-06-17 PROCEDURE — 74183 MRI ABD W/O CNTR FLWD CNTR: CPT | Performed by: RADIOLOGY

## 2025-06-17 RX ORDER — GADOTERATE MEGLUMINE 376.9 MG/ML
12 INJECTION INTRAVENOUS
Status: COMPLETED | OUTPATIENT
Start: 2025-06-17 | End: 2025-06-17

## 2025-06-17 RX ADMIN — GADOTERATE MEGLUMINE 12 ML: 376.9 INJECTION INTRAVENOUS at 10:16

## 2025-06-17 ASSESSMENT — ENCOUNTER SYMPTOMS
LOSS OF SENSATION IN FEET: 0
OCCASIONAL FEELINGS OF UNSTEADINESS: 0
DEPRESSION: 0

## 2025-07-10 NOTE — ASSESSMENT & PLAN NOTE
Orders:    Follow Up In [facility] - PCP - Medicare Annual; Future    Follow Up In [facility] - PCP - Established; Future     Physical Medicine and Rehabilitation [facility] Consultation   07/10/25    Chief Complaint:  Neck Pain     HPI:  Rika Vasquez is a 32 y.o. old R handed female who presents to the clinic today at the request of PJ Wilcox  for evaluation of neck pain and headaches  Onset: more than 5 years, was infrequent and had intermittent headaches  5 years ago was more severe and has migraines  Headaches are sides of the head, temporal area and radiated down he scalp to and left neck pain   Location: as above  Radiation: left upper tingling  Quality: dull ache, sharp pain sometimes  Duration: dull ache is constant, numbness in the neck    Aggravating factors:  wake up in am, sometimes just comes on  Alleviating factors: not sure, yoga and neck stretches and massage but short term  Severity: 3  Numbness/Tingling: Yes   Bowel or bladder incontinence: No   Current medication regimen: robaxin. Maxalt for migraines,   Wo relief  Nsaids wo relief  Heat helps more than ice     Treatment to date:  Physical therapy: Yes posture, some rom, mild traction  Massage therapy structural work, once a month which gives some relief  Chiropractic adjustments in the past  Some tings have helped temp relief  Medications taken to date for this complaint include the following:   As above, no relief w nsaids or tylenol    Prior injections: Yes    Has not tried dry needling      Imaging  none     Medical History[1]   HTN    Surgical History[2]     Patient Active Problem List    Diagnosis Date Noted    Chronic neck pain 06/02/2025    Muscle spasm 06/02/2025    Chronic midline low back pain without sciatica 06/02/2025    Depressed mood 05/15/2025    Chronic headache disorder 04/28/2025    Seasonal allergies 04/28/2025    Migraine headache 04/28/2025    Healthcare maintenance 10/03/2024    Encounter for refill of prescription for contraception 02/06/2024    Screening for cervical cancer 02/06/2024    Anxiety 03/01/2023    Migraines 03/01/2023     Benign essential hypertension 03/01/2023        Family History[3]     Current Medications[4]     Allergies[5]     Social History[6]    Lives alone  No smoking alcohol or drug use       Review of Systems  Constitutional:  Denies fever or chills   Eyes:  Denies change in visual acuity   HENT:  Denies nasal congestion or sore throat   Respiratory:  Denies cough or shortness of breath   Cardiovascular:  Denies chest pain or edema   GI:  Denies abdominal pain, nausea, vomiting, bloody stools or diarrhea   :  Denies dysuria   Integument:  Denies rash   Neurologic: as per HPI  MSK: Per above HPI  Endocrine:  Denies polyuria or polydipsia   Lymphatic:  Denies swollen glands   Psychiatric:  Denies depression or anxiety         Physical Exam:  /86 (BP Location: Left arm, Patient Position: Sitting)   Pulse 91   Resp 20     General:  NAD, F    Psychiatric: appropriate mood & affect.   Cardiovascular:  Normal radial pulses; no UE  edema.  Respiratory:  Normal rate; unlabored breathing.  Skin:  Intact; no erythema; no ecchymosis or rash.  Lymphatic:  No lymphadenopathy or lymphedema.  NEURO:  Alert and appropriate.  Speech fluent, conversing appropriately.   Motor:    Rt: SA 5/5, SER 5/5, EE 5/5, EF 5/5, WE 5/5, FDIM 5/5, ADM 5/5    Lt: SA 5/5, SER 5/5, EE 5/5, EF 5/5, WE 5/5, FDIM 5/5, ADM 5/5    Rt: HF 5/5    Lt: HF 5/5  Sensation:     Light touch intact in the b/l C5-T2 dermatomes.     PP: intact in the b/l C5-T2 dermatomes.  Reflexes:   Brisk UE and LE reflexes    Babinski's downgoing; no clonus     Hoffmans: negative bilateral   Gait: Normal, narrow based gait.  Tandem gait WNL.    MSK:  Inspection of the neck reveals no soft tissue swelling or ecchymoses.   Cervical flexion full; cervical extension to 10°, right rotation 20°; left rotation w0°.  There is tenderness over the R more then L upper traps.   Special Tests:    Spurling's maneuver:positive :      Bakody's sign: positive            Impression:  Rika Kessler is a 32 y.o. F w pmh of htn and migraines presenting with cervicogenic headaches, likely occipital neuralgia, and cervical myofascial pain.   with:  1.      Plan:     Orders Placed This Encounter   Procedures    XR cervical spine 2-3 views    Referral to Neurology    Referral to [facility]    1. Xr c spine  2. Neuro referral for migraines, likely has other types of migraine headaches not just cervicogenic  3. [facility] referral for chiropractic gentle manipulation, myofascial release, dry needling  4. If conservative measures wo significant relief, will order mri and referral to pain for eval cristóbal vs mbb vs occipital nerve blocks  5 mobic daily prn, discussed no otc nsaids  6. Gabapentin 100 mg bid titration to 300 mg bid as tolerated  7. Fu for trigger point injections    Thank you for the consultation.     Nena Carter MD      Physical Medicine and Rehabilitation          [1]   Past Medical History:  Diagnosis Date    Allergic 2010    Anxiety Jan 2023    Headache, unspecified     Chronic headaches    Hypertension Jan 2023    Migraine     Other conditions influencing health status     Chronic migraine    Other seasonal allergic rhinitis     Seasonal allergies    Personal history of other diseases of the musculoskeletal system and connective tissue     History of neck pain   [2]   Past Surgical History:  Procedure Laterality Date    OTHER SURGICAL HISTORY  07/14/2022    Oral surgery    WISDOM TOOTH EXTRACTION  2013   [3]   Family History  Problem Relation Name Age of Onset    Hypertension Mother Jessenia kessler     Migraines Mother Jessenia kessler     Diabetes Mother Jessenia kessler     Other (Cardiac disorder) Father Ed victoria     Hypertension Father Ed victoria     Migraines Father Ed victoria     Heart attack Father Ed victoria     Arthritis Father Ed victoria     Blood clot Father Ed victoria     Hearing loss Father Ed victoria     Heart disease Father Ed victoria     Other (Cardiac disorder) Father's  Brother Richard kessler     Hypertension Father's Brother Richard kessler     Migraines Father's Brother Richard kessler     Heart attack Father's Brother Richard kessler     Other (Cardiac disorder) Other      Hypertension Other      Migraines Other      Heart attack Other     [4]   Current Outpatient Medications   Medication Sig Dispense Refill    ACETAMINOPHEN-CAFFEINE ORAL -      albuterol (Ventolin HFA) 90 mcg/actuation inhaler Inhale 2 puffs every 4 hours if needed for wheezing or shortness of breath. 20.1 g 0    escitalopram (Lexapro) 20 mg tablet Take 1 tablet (20 mg) by mouth once daily. 90 tablet 3    lisinopril 10 mg tablet Take 1 tablet (10 mg) by mouth once daily. 90 tablet 3    magnesium oxide 400 mg magnesium capsule -      methocarbamol (Robaxin) 500 mg tablet Take 1 tablet (500 mg) by mouth once daily at bedtime. 60 tablet 3    multivitamin tablet -      noreth-ethinyl estradioL-iron 0.8mg-25mcg(24) and 75 mg (4) tablet,chewable Chew 1 tablet once daily. 90 tablet 4    rizatriptan (Maxalt) 10 mg tablet TAKE 1 TABLET (10 MG) BY MOUTH ONCE DAILY AS NEEDED FOR MIGRAINE. MAY REPEAT IN 2 HOURS IF UNRESOLVED. DO NOT EXCEED 30 MG IN 24 HOURS. 12 tablet 3     No current facility-administered medications for this visit.   [5] No Known Allergies  [6]   Social History  Socioeconomic History    Marital status: Single   Tobacco Use    Smoking status: Never     Passive exposure: Never    Smokeless tobacco: Never   Vaping Use    Vaping status: Never Used   Substance and Sexual Activity    Alcohol use: Yes     Alcohol/week: 1.0 standard drink of alcohol     Types: 1 Cans of beer per week     Comment: This is only monthy if that.    Drug use: Never    Sexual activity: Yes     Partners: Male     Birth control/protection: Condom Male     Comment: Birth control     Social Drivers of Health     Food Insecurity: No Food Insecurity (5/15/2025)    Hunger Vital Sign     Worried About Running Out of Food in the Last Year: Never true     Ran Out  of Food in the Last Year: Never true   Transportation Needs: No Transportation Needs (2/10/2025)    PRAPARE - Transportation     Lack of Transportation (Medical): No     Lack of Transportation (Non-Medical): No

## 2025-07-17 ENCOUNTER — RESULTS FOLLOW-UP (OUTPATIENT)
Dept: GASTROENTEROLOGY | Facility: CLINIC | Age: 75
End: 2025-07-17
Payer: MEDICARE

## 2025-07-17 NOTE — TELEPHONE ENCOUNTER
----- Message from Katelyn Curry sent at 6/28/2025 11:31 PM EDT -----  He has a large pancreatic head cyst approximately 4 cm with septation seen on recent MRI MRCP.  He needs to have endoscopic ultrasound with FNA.  45 minutes slots  ----- Message -----  From: Interface, Radiology Results In  Sent: 6/17/2025   2:50 PM EDT  To: Katelyn Curry MD

## 2025-07-31 ENCOUNTER — ANESTHESIA EVENT (OUTPATIENT)
Dept: GASTROENTEROLOGY | Facility: HOSPITAL | Age: 75
End: 2025-07-31
Payer: MEDICARE

## 2025-07-31 ENCOUNTER — ANESTHESIA (OUTPATIENT)
Dept: GASTROENTEROLOGY | Facility: HOSPITAL | Age: 75
End: 2025-07-31
Payer: MEDICARE

## 2025-07-31 ENCOUNTER — HOSPITAL ENCOUNTER (OUTPATIENT)
Dept: GASTROENTEROLOGY | Facility: HOSPITAL | Age: 75
Discharge: HOME | End: 2025-07-31
Payer: MEDICARE

## 2025-07-31 VITALS
WEIGHT: 122 LBS | HEART RATE: 62 BPM | DIASTOLIC BLOOD PRESSURE: 70 MMHG | RESPIRATION RATE: 16 BRPM | SYSTOLIC BLOOD PRESSURE: 130 MMHG | HEIGHT: 66 IN | TEMPERATURE: 97.7 F | OXYGEN SATURATION: 98 % | BODY MASS INDEX: 19.61 KG/M2

## 2025-07-31 DIAGNOSIS — K86.2 PANCREATIC CYST (HHS-HCC): Primary | ICD-10-CM

## 2025-07-31 PROCEDURE — 3700000001 HC GENERAL ANESTHESIA TIME - INITIAL BASE CHARGE

## 2025-07-31 PROCEDURE — 3700000002 HC GENERAL ANESTHESIA TIME - EACH INCREMENTAL 1 MINUTE

## 2025-07-31 PROCEDURE — 2500000004 HC RX 250 GENERAL PHARMACY W/ HCPCS (ALT 636 FOR OP/ED): Mod: JW | Performed by: NURSE ANESTHETIST, CERTIFIED REGISTERED

## 2025-07-31 PROCEDURE — 43237 ENDOSCOPIC US EXAM ESOPH: CPT | Performed by: INTERNAL MEDICINE

## 2025-07-31 PROCEDURE — 7100000010 HC PHASE TWO TIME - EACH INCREMENTAL 1 MINUTE

## 2025-07-31 PROCEDURE — 7100000009 HC PHASE TWO TIME - INITIAL BASE CHARGE

## 2025-07-31 RX ORDER — PSYLLIUM HUSK 0.4 G
3 CAPSULE ORAL NIGHTLY
COMMUNITY

## 2025-07-31 RX ORDER — PROPOFOL 10 MG/ML
INJECTION, EMULSION INTRAVENOUS AS NEEDED
Status: DISCONTINUED | OUTPATIENT
Start: 2025-07-31 | End: 2025-07-31

## 2025-07-31 RX ORDER — LIDOCAINE HYDROCHLORIDE 20 MG/ML
INJECTION, SOLUTION INFILTRATION; PERINEURAL AS NEEDED
Status: DISCONTINUED | OUTPATIENT
Start: 2025-07-31 | End: 2025-07-31

## 2025-07-31 RX ADMIN — PROPOFOL 30 MG: 10 INJECTION, EMULSION INTRAVENOUS at 08:05

## 2025-07-31 RX ADMIN — LIDOCAINE HYDROCHLORIDE 5 ML: 20 INJECTION, SOLUTION INFILTRATION; PERINEURAL at 08:03

## 2025-07-31 RX ADMIN — SODIUM CHLORIDE, SODIUM LACTATE, POTASSIUM CHLORIDE, AND CALCIUM CHLORIDE: .6; .31; .03; .02 INJECTION, SOLUTION INTRAVENOUS at 07:57

## 2025-07-31 RX ADMIN — PROPOFOL 80 MG: 10 INJECTION, EMULSION INTRAVENOUS at 08:02

## 2025-07-31 RX ADMIN — PROPOFOL 175 MCG/KG/MIN: 10 INJECTION, EMULSION INTRAVENOUS at 08:03

## 2025-07-31 SDOH — HEALTH STABILITY: MENTAL HEALTH: CURRENT SMOKER: 0

## 2025-07-31 ASSESSMENT — COLUMBIA-SUICIDE SEVERITY RATING SCALE - C-SSRS
1. IN THE PAST MONTH, HAVE YOU WISHED YOU WERE DEAD OR WISHED YOU COULD GO TO SLEEP AND NOT WAKE UP?: NO
6. HAVE YOU EVER DONE ANYTHING, STARTED TO DO ANYTHING, OR PREPARED TO DO ANYTHING TO END YOUR LIFE?: NO
2. HAVE YOU ACTUALLY HAD ANY THOUGHTS OF KILLING YOURSELF?: NO

## 2025-07-31 ASSESSMENT — PAIN - FUNCTIONAL ASSESSMENT
PAIN_FUNCTIONAL_ASSESSMENT: 0-10

## 2025-07-31 ASSESSMENT — PAIN SCALES - GENERAL
PAINLEVEL_OUTOF10: 3
PAINLEVEL_OUTOF10: 0 - NO PAIN
PAINLEVEL_OUTOF10: 0 - NO PAIN

## 2025-07-31 NOTE — DISCHARGE INSTRUCTIONS

## 2025-07-31 NOTE — ANESTHESIA PREPROCEDURE EVALUATION
Patient: Hal Gregorio    Procedure Information       Date/Time: 07/31/25 0730    Scheduled providers: Katelyn Curry MD    Procedure: ENDOSCOPIC ULTRASOUND (UPPER)    Location: [facility]            Relevant Problems   Cardiac   (+) Hyperlipidemia   (+) Hypertension   (+) PVD (peripheral vascular disease)      Pulmonary   (+) Community acquired pneumonia of left upper lobe of lung      Neuro   (+) Generalized anxiety disorder   (+) Lumbar radiculopathy, acute      GI   (+) Dysphagia   (+) Other dysphagia      /Renal   (+) Benign enlargement of prostate      Liver   (+) Hepatic steatosis      Hematology   (+) Normocytic anemia   (+) Thrombocytopenia, unspecified      Musculoskeletal   (+) Chronic low back pain   (+) Chronic neck pain   (+) Generalized osteoarthritis of multiple sites      HEENT   (+) Acute sinusitis   (+) Seasonal allergies      ID   (+) Community acquired pneumonia of left upper lobe of lung       Clinical information reviewed:   Tobacco  Allergies  Meds   Med Hx  Surg Hx   Fam Hx  Soc Hx        NPO Detail:  NPO/Void Status  Carbohydrate Drink Given Prior to Surgery? : N  Date of Last Liquid: 07/31/25  Time of Last Liquid: 0500  Date of Last Solid: 07/30/25  Time of Last Solid: 2100  Last Intake Type: Clear fluids  Time of Last Void: 0630         Physical Exam    Airway  Mallampati: II  TM distance: >3 FB  Neck ROM: full  Mouth opening: 3 or more finger widths     Cardiovascular   Rhythm: regular  Rate: normal     Dental - normal exam     Pulmonary Breath sounds clear to auscultation     Abdominal            Anesthesia Plan    History of general anesthesia?: yes  History of complications of general anesthesia?: no    ASA 2     MAC     The patient is not a current smoker.    intravenous induction   Anesthetic plan and risks discussed with patient.    Plan discussed with [facility]AA.

## 2025-07-31 NOTE — H&P
History Of Present Illness  Hal Gregorio is a 74 y.o. male here for EUS/FNA of pancreatic cyst.     Past Medical History  Medical History[1]  Surgical History  Surgical History[2]  Social History  He reports that he quit smoking about 41 years ago. His smoking use included cigarettes. He started smoking about 58 years ago. He has a 25.5 pack-year smoking history. He has been exposed to tobacco smoke. He has never used smokeless tobacco. He reports current alcohol use of about 1.0 - 3.0 standard drink of alcohol per week. He reports that he does not currently use drugs after having used the following drugs: Marijuana.    Family History  Family History[3]     Allergies  Allergies[4]  Review of Systems   Review of Systems   Constitutional: Negative.  Negative for activity change, appetite change, chills, fatigue, fever and unexpected weight change.   HENT: Negative.     Respiratory: Negative.     Cardiovascular: Negative.  Negative for chest pain and palpitations.   Gastrointestinal: Negative.  Negative for abdominal distention, abdominal pain, anal bleeding, blood in stool, constipation, diarrhea, nausea, rectal pain and vomiting.   Skin: Negative.  Negative for color change and rash.   Neurological: Negative.  Negative for dizziness, tremors, seizures, weakness and headaches.   Psychiatric/Behavioral: Negative.  Negative for confusion.     Physical Exam   General appearance: No acute distress, cooperative.  Eyes: Nonicteric, no redness or proptosis  Ears, nose, mouth, and throat: Moist mucous membranes, tongue normal  Neck: Supple, no lymphadenopathy or thyromegaly  Lungs: Clear to auscultation bilaterally  CV: Regular rate and rhythm, no murmur; no pitting edema in the lower extremities  Abd: soft, non-tender; non-distended; normal active bowel sounds; no  scars  Skin: No rashes or lesions; no liver stigmata  MSK: No deformities or joint edema/redness/tenderness  Neuro: Alert and oriented ×3, normal gait,  "non-focal no asterixis    Last Recorded Vitals  Blood pressure 143/82, pulse 60, temperature 36.6 °C (97.9 °F), resp. rate 18, height 1.676 m (5' 6\"), weight 55.3 kg (122 lb), SpO2 99%.    Assessment/Plan        Katelyn Curry MD       [1]   Past Medical History:  Diagnosis Date    Anxiety disorder, unspecified     Chronic anxiety    Central retinal vein occlusion, unspecified eye, stable     Retinal vein occlusion    Cervicalgia     Cervicalgia    Deficiency of other specified B group vitamins 02/17/2020    B12 deficiency    Elevated erythrocyte sedimentation rate     Elevated erythrocyte sedimentation rate    Fever, unspecified     Fever of unknown origin    Generalized anxiety disorder 03/21/2013    Generalized anxiety disorder    Other conditions influencing health status     Methicillin Resistant Staphylococcus Aureus Infection    Other malaise     Malaise    Pain in right shoulder     Pain in joint of right shoulder    Personal history of diseases of the blood and blood-forming organs and certain disorders involving the immune mechanism     History of iron deficiency anemia    Personal history of other diseases of the circulatory system     History of hypertension    Personal history of other diseases of the musculoskeletal system and connective tissue     History of polymyalgia rheumatica    Personal history of other endocrine, nutritional and metabolic disease     History of hyperglycemia    Personal history of other specified conditions     History of weakness    Personal history of other specified conditions     History of shortness of breath    Personal history of other specified conditions 03/21/2013    History of chest pain    Sedative, hypnotic or anxiolytic dependence, 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    Benzodiazepine dependence, continuous    Vitamin B12 deficiency anemia due to intrinsic factor deficiency     Pernicious anemia    Vitamin D deficiency, unspecified 02/17/2020    Vitamin D deficiency "   [2]   Past Surgical History:  Procedure Laterality Date    BACK SURGERY  03/21/2013    Back Surgery    HAND SURGERY  03/21/2013    Hand Surgery                                                                                                                                                          KNEE SURGERY  03/21/2013    Knee Surgery    NECK SURGERY  12/18/2017    Neck Surgery    OTHER SURGICAL HISTORY  03/10/2022    Hypertension   [3]   Family History  Problem Relation Name Age of Onset    Hypertension Mother      Diabetes Father      Hypertension Father      Heart attack Father     [4]   Allergies  Allergen Reactions    Nsaids (Non-Steroidal Anti-Inflammatory Drug) Shortness of breath    Duloxetine Unknown    Indomethacin Unknown    Irbesartan Other and Unknown     Dry mouth    Lisinopril Unknown     ineffective    Metaxalone Unknown     Stomach issues    Metoprolol Hives    Amlodipine Rash and Swelling

## 2025-07-31 NOTE — ANESTHESIA POSTPROCEDURE EVALUATION
Patient: Hal Gregorio    Procedure Summary       Date: 07/31/25 [place] [facility] [place]esthesia Start: 0757 Anesthesia Stop: 0822    Procedure: ENDOSCOPIC ULTRASOUND (UPPER) Diagnosis: Pancreatic cyst ([facility])    Scheduled Providers: Katelyn Curry MD Responsible Provider: Dereck Ramirez MD    Anesthesia Type: MAC ASA Status: 2            Anesthesia Type: MAC    Vitals Value Taken Time   /70 07/31/25 08:42   Temp 36.5 °C (97.7 °F) 07/31/25 08:20   Pulse 62 07/31/25 08:42   Resp 16 07/31/25 08:42   SpO2 98 % 07/31/25 08:42       Anesthesia Post Evaluation    Patient location during evaluation: PACU  Patient participation: complete - patient participated  Level of consciousness: awake and alert  Pain management: satisfactory to patient  Airway patency: patent  Cardiovascular status: acceptable  Respiratory status: acceptable  Hydration status: acceptable  Postoperative Nausea and Vomiting: none        No notable events documented.

## 2025-08-26 ENCOUNTER — ANESTHESIA (OUTPATIENT)
Dept: GASTROENTEROLOGY | Facility: HOSPITAL | Age: 75
End: 2025-08-26
Payer: MEDICARE

## 2025-08-26 ENCOUNTER — HOSPITAL ENCOUNTER (OUTPATIENT)
Dept: GASTROENTEROLOGY | Facility: HOSPITAL | Age: 75
Discharge: HOME | End: 2025-08-26
Payer: MEDICARE

## 2025-08-26 ENCOUNTER — ANESTHESIA EVENT (OUTPATIENT)
Dept: GASTROENTEROLOGY | Facility: HOSPITAL | Age: 75
End: 2025-08-26
Payer: MEDICARE

## 2025-08-26 VITALS
BODY MASS INDEX: 19.13 KG/M2 | OXYGEN SATURATION: 100 % | HEART RATE: 78 BPM | DIASTOLIC BLOOD PRESSURE: 83 MMHG | HEIGHT: 66 IN | SYSTOLIC BLOOD PRESSURE: 140 MMHG | TEMPERATURE: 98.1 F | RESPIRATION RATE: 16 BRPM | WEIGHT: 119 LBS

## 2025-08-26 DIAGNOSIS — K86.2 PANCREATIC CYST (HHS-HCC): ICD-10-CM

## 2025-08-26 PROCEDURE — 3700000002 HC GENERAL ANESTHESIA TIME - EACH INCREMENTAL 1 MINUTE

## 2025-08-26 PROCEDURE — 99100 ANES PT EXTEME AGE<1 YR&>70: CPT | Performed by: ANESTHESIOLOGY

## 2025-08-26 PROCEDURE — 86301 IMMUNOASSAY TUMOR CA 19-9: CPT | Mod: STJLAB | Performed by: INTERNAL MEDICINE

## 2025-08-26 PROCEDURE — 36415 COLL VENOUS BLD VENIPUNCTURE: CPT | Performed by: INTERNAL MEDICINE

## 2025-08-26 PROCEDURE — 2500000004 HC RX 250 GENERAL PHARMACY W/ HCPCS (ALT 636 FOR OP/ED): Performed by: NURSE ANESTHETIST, CERTIFIED REGISTERED

## 2025-08-26 PROCEDURE — A43238 PR EDG INTRMURAL US NEEDLE ASPIRATE/BIOPSY ESOPHAGS: Performed by: NURSE ANESTHETIST, CERTIFIED REGISTERED

## 2025-08-26 PROCEDURE — 3700000001 HC GENERAL ANESTHESIA TIME - INITIAL BASE CHARGE

## 2025-08-26 PROCEDURE — 7100000010 HC PHASE TWO TIME - EACH INCREMENTAL 1 MINUTE

## 2025-08-26 PROCEDURE — A43238 PR EDG INTRMURAL US NEEDLE ASPIRATE/BIOPSY ESOPHAGS: Performed by: ANESTHESIOLOGY

## 2025-08-26 PROCEDURE — 43237 ENDOSCOPIC US EXAM ESOPH: CPT | Performed by: INTERNAL MEDICINE

## 2025-08-26 PROCEDURE — 7100000009 HC PHASE TWO TIME - INITIAL BASE CHARGE

## 2025-08-26 RX ORDER — ACETAMINOPHEN 325 MG/1
650 TABLET ORAL EVERY 4 HOURS PRN
OUTPATIENT
Start: 2025-08-26

## 2025-08-26 RX ORDER — PROPOFOL 10 MG/ML
INJECTION, EMULSION INTRAVENOUS AS NEEDED
Status: DISCONTINUED | OUTPATIENT
Start: 2025-08-26 | End: 2025-08-26

## 2025-08-26 RX ORDER — LIDOCAINE HYDROCHLORIDE 20 MG/ML
INJECTION, SOLUTION INFILTRATION; PERINEURAL AS NEEDED
Status: DISCONTINUED | OUTPATIENT
Start: 2025-08-26 | End: 2025-08-26

## 2025-08-26 RX ORDER — ONDANSETRON HYDROCHLORIDE 2 MG/ML
4 INJECTION, SOLUTION INTRAVENOUS ONCE AS NEEDED
OUTPATIENT
Start: 2025-08-26

## 2025-08-26 RX ORDER — LIDOCAINE HYDROCHLORIDE 10 MG/ML
0.1 INJECTION, SOLUTION INFILTRATION; PERINEURAL ONCE
OUTPATIENT
Start: 2025-08-26 | End: 2025-08-26

## 2025-08-26 RX ORDER — FENTANYL CITRATE 50 UG/ML
INJECTION, SOLUTION INTRAMUSCULAR; INTRAVENOUS AS NEEDED
Status: DISCONTINUED | OUTPATIENT
Start: 2025-08-26 | End: 2025-08-26

## 2025-08-26 RX ORDER — CIPROFLOXACIN 2 MG/ML
INJECTION, SOLUTION INTRAVENOUS AS NEEDED
Status: DISCONTINUED | OUTPATIENT
Start: 2025-08-26 | End: 2025-08-26

## 2025-08-26 RX ADMIN — FENTANYL CITRATE 100 MCG: 50 INJECTION, SOLUTION INTRAMUSCULAR; INTRAVENOUS at 10:37

## 2025-08-26 RX ADMIN — PROPOFOL 20 MG: 10 INJECTION, EMULSION INTRAVENOUS at 10:41

## 2025-08-26 RX ADMIN — CIPROFLOXACIN 400 MG: 400 INJECTION, SOLUTION INTRAVENOUS at 10:46

## 2025-08-26 RX ADMIN — PROPOFOL 50 MG: 10 INJECTION, EMULSION INTRAVENOUS at 10:36

## 2025-08-26 RX ADMIN — PROPOFOL 20 MG: 10 INJECTION, EMULSION INTRAVENOUS at 10:38

## 2025-08-26 RX ADMIN — PROPOFOL 100 MCG/KG/MIN: 10 INJECTION, EMULSION INTRAVENOUS at 10:37

## 2025-08-26 RX ADMIN — LIDOCAINE HYDROCHLORIDE 5 ML: 20 INJECTION, SOLUTION INFILTRATION; PERINEURAL at 10:40

## 2025-08-26 RX ADMIN — SODIUM CHLORIDE, POTASSIUM CHLORIDE, SODIUM LACTATE AND CALCIUM CHLORIDE: 600; 310; 30; 20 INJECTION, SOLUTION INTRAVENOUS at 10:33

## 2025-08-26 SDOH — HEALTH STABILITY: MENTAL HEALTH: CURRENT SMOKER: 0

## 2025-08-26 ASSESSMENT — PAIN - FUNCTIONAL ASSESSMENT
PAIN_FUNCTIONAL_ASSESSMENT: 0-10
PAIN_FUNCTIONAL_ASSESSMENT: 0-10

## 2025-08-26 ASSESSMENT — PAIN SCALES - GENERAL
PAINLEVEL_OUTOF10: 0 - NO PAIN
PAINLEVEL_OUTOF10: 2
PAIN_LEVEL: 0

## 2025-08-27 LAB — CANCER AG19-9 SERPL-ACNC: 11.84 U/ML

## 2025-09-03 ENCOUNTER — TELEPHONE (OUTPATIENT)
Dept: GASTROENTEROLOGY | Facility: CLINIC | Age: 75
End: 2025-09-03
Payer: MEDICARE

## 2025-10-03 ENCOUNTER — APPOINTMENT (OUTPATIENT)
Dept: GASTROENTEROLOGY | Facility: CLINIC | Age: 75
End: 2025-10-03
Payer: MEDICARE

## 2025-10-07 ENCOUNTER — APPOINTMENT (OUTPATIENT)
Dept: PRIMARY CARE | Facility: CLINIC | Age: 75
End: 2025-10-07
Payer: MEDICARE